# Patient Record
Sex: FEMALE | Race: BLACK OR AFRICAN AMERICAN | Employment: FULL TIME | URBAN - METROPOLITAN AREA
[De-identification: names, ages, dates, MRNs, and addresses within clinical notes are randomized per-mention and may not be internally consistent; named-entity substitution may affect disease eponyms.]

---

## 2017-04-07 ENCOUNTER — TELEPHONE (OUTPATIENT)
Dept: FAMILY PLANNING/WOMEN'S HEALTH CLINIC | Age: 57
End: 2017-04-07

## 2017-07-12 ENCOUNTER — HOSPITAL ENCOUNTER (OUTPATIENT)
Dept: MAMMOGRAPHY | Age: 57
Discharge: HOME | End: 2017-07-12
Attending: INTERNAL MEDICINE
Payer: COMMERCIAL

## 2017-07-12 DIAGNOSIS — Z12.31 VISIT FOR SCREENING MAMMOGRAM: ICD-10-CM

## 2017-07-12 PROCEDURE — 77067 SCR MAMMO BI INCL CAD: CPT

## 2017-07-31 ENCOUNTER — HOSPITAL ENCOUNTER (OUTPATIENT)
Dept: MAMMOGRAPHY | Age: 57
Discharge: HOME | End: 2017-07-31
Attending: INTERNAL MEDICINE
Payer: COMMERCIAL

## 2017-07-31 DIAGNOSIS — R92.8 ABNORMAL MAMMOGRAM OF RIGHT BREAST: ICD-10-CM

## 2017-07-31 PROCEDURE — 77065 DX MAMMO INCL CAD UNI: CPT

## 2017-08-18 ENCOUNTER — HOSPITAL ENCOUNTER (OUTPATIENT)
Dept: MAMMOGRAPHY | Age: 57
Discharge: HOME | End: 2017-08-18
Attending: INTERNAL MEDICINE
Payer: COMMERCIAL

## 2017-08-18 DIAGNOSIS — R92.1 BREAST CALCIFICATIONS: ICD-10-CM

## 2017-08-18 PROCEDURE — 74011000250 HC RX REV CODE- 250: Performed by: RADIOLOGY

## 2017-08-18 PROCEDURE — 88305 TISSUE EXAM BY PATHOLOGIST: CPT | Performed by: RADIOLOGY

## 2017-08-18 PROCEDURE — 77065 DX MAMMO INCL CAD UNI: CPT

## 2017-08-18 PROCEDURE — 77030030538 MAM STEREO VAC  BX BREAST RT 1ST LESION W/CLIP AND SPECIMEN

## 2017-08-18 RX ORDER — LIDOCAINE HYDROCHLORIDE AND EPINEPHRINE 10; 10 MG/ML; UG/ML
8 INJECTION, SOLUTION INFILTRATION; PERINEURAL ONCE
Status: COMPLETED | OUTPATIENT
Start: 2017-08-18 | End: 2017-08-18

## 2017-08-18 RX ORDER — LIDOCAINE HYDROCHLORIDE 10 MG/ML
12 INJECTION INFILTRATION; PERINEURAL
Status: COMPLETED | OUTPATIENT
Start: 2017-08-18 | End: 2017-08-18

## 2017-08-18 RX ADMIN — LIDOCAINE HYDROCHLORIDE,EPINEPHRINE BITARTRATE 80 MG: 10; .01 INJECTION, SOLUTION INFILTRATION; PERINEURAL at 09:40

## 2017-08-18 RX ADMIN — LIDOCAINE HYDROCHLORIDE 12 ML: 10 INJECTION, SOLUTION INFILTRATION; PERINEURAL at 09:40

## 2017-08-18 NOTE — PROGRESS NOTES
Patient tolerated procedure well with minimal bleeding and no bruising.  Discharge instructions were reviewed and results will be called to her Tuesday 8/22/17 between 1-3 pm.

## 2017-08-22 NOTE — PROGRESS NOTES
Results of biopsy were called to patient. She stated that she had reaction to dressing which turned red and irritated. She removed bandage and skin has improved. Otherwise biopsy site is healing well with no bruising.

## 2018-08-30 LAB
CREATININE, EXTERNAL: 0.72
LDL-C, EXTERNAL: 88

## 2018-08-31 LAB — HBA1C MFR BLD HPLC: 7.6 %

## 2018-10-12 ENCOUNTER — HOSPITAL ENCOUNTER (OUTPATIENT)
Dept: MAMMOGRAPHY | Age: 58
Discharge: HOME | End: 2018-10-12
Attending: INTERNAL MEDICINE
Payer: COMMERCIAL

## 2018-10-12 DIAGNOSIS — Z12.31 VISIT FOR SCREENING MAMMOGRAM: ICD-10-CM

## 2018-10-12 PROCEDURE — 77067 SCR MAMMO BI INCL CAD: CPT

## 2018-10-15 ENCOUNTER — OFFICE VISIT (OUTPATIENT)
Dept: ENDOCRINOLOGY | Age: 58
End: 2018-10-15

## 2018-10-15 VITALS
HEART RATE: 82 BPM | OXYGEN SATURATION: 98 % | TEMPERATURE: 97.8 F | RESPIRATION RATE: 14 BRPM | DIASTOLIC BLOOD PRESSURE: 71 MMHG | BODY MASS INDEX: 41.17 KG/M2 | WEIGHT: 247.1 LBS | SYSTOLIC BLOOD PRESSURE: 147 MMHG | HEIGHT: 65 IN

## 2018-10-15 DIAGNOSIS — I10 ESSENTIAL HYPERTENSION: ICD-10-CM

## 2018-10-15 DIAGNOSIS — E11.65 TYPE 2 DIABETES MELLITUS WITH HYPERGLYCEMIA, WITHOUT LONG-TERM CURRENT USE OF INSULIN (HCC): Primary | ICD-10-CM

## 2018-10-15 DIAGNOSIS — E11.65 TYPE 2 DIABETES MELLITUS WITH HYPERGLYCEMIA, UNSPECIFIED WHETHER LONG TERM INSULIN USE (HCC): Primary | ICD-10-CM

## 2018-10-15 DIAGNOSIS — E66.01 MORBID OBESITY (HCC): ICD-10-CM

## 2018-10-15 RX ORDER — GLIPIZIDE 10 MG/1
10 TABLET ORAL 2 TIMES DAILY
Qty: 180 TAB | Refills: 3 | Status: SHIPPED | OUTPATIENT
Start: 2018-10-15 | End: 2019-02-19 | Stop reason: SDUPTHER

## 2018-10-15 RX ORDER — ZINC GLUCONATE 10 MG
LOZENGE ORAL
COMMUNITY

## 2018-10-15 NOTE — MR AVS SNAPSHOT
[address] [facility] [address] 
[phone] Patient: Jalyn Ruelas MRN: [id number] VUT:[id number] Visit Information Date & Time Provider Department Dept. Phone Encounter #  
 10/15/2018  3:00 PM Boris Sherman MD [facility] Diabetes [facility] Endocrinology [phone] [id number] Follow-up Instructions Return in about 4 months (around 2/15/2019). Upcoming Health Maintenance Date Due Hepatitis C Screening 1960 FOOT EXAM Q1 9/21/1970 EYE EXAM RETINAL OR DILATED Q1 9/21/1970 Pneumococcal 19-64 Medium Risk (1 of 1 - PPSV23) 9/21/1979 DTaP/Tdap/Td series (1 - Tdap) 9/21/1981 Shingrix Vaccine Age 50> (1 of 2) 9/21/2010 FOBT Q 1 YEAR AGE 50-75 9/21/2010 LIPID PANEL Q1 12/13/2015 HEMOGLOBIN A1C Q6M 8/13/2016 MICROALBUMIN Q1 2/13/2017 PAP AKA CERVICAL CYTOLOGY 2/14/2018 Influenza Age 5 to Adult 8/1/2018 BREAST CANCER SCRN MAMMOGRAM 10/12/2020 Allergies as of 10/15/2018  Review Complete On: 10/15/2018 By: Boris Sherman MD  
  
 Severity Noted Reaction Type Reactions Nickel Medium 02/14/2015    Rash Linagliptin  12/14/2017    Swelling Swelling of tongue Current Immunizations  Never Reviewed Name Date Influenza Vaccine Ramona Koyanagi) 12/13/2014 Influenza Vaccine (Quad) PF 2/13/2016 TB Skin Test (PPD) Intradermal 12/13/2014 Not reviewed this visit You Were Diagnosed With   
  
 Codes Comments Type 2 diabetes mellitus with hyperglycemia, without long-term current use of insulin (HCC)    -  Primary ICD-10-CM: E11.65 ICD-9-CM: 250.00, 790.29 Essential hypertension     ICD-10-CM: I10 
ICD-9-CM: 401.9 Vitals BP Pulse Temp Resp Height(growth percentile) Weight(growth percentile) 147/71 (BP 1 Location: Left arm, BP Patient Position: Sitting) 82 97.8 °F (36.6 °C) (Oral) 14 5' 5\" (1.651 m) 247 lb 1.6 oz (112.1 kg) SpO2 BMI OB Status Smoking Status 98% 41.12 kg/m2 Menopause Never Smoker Vitals History BMI and BSA Data Body Mass Index Body Surface Area  
 41.12 kg/m 2 2.27 m 2 Preferred Pharmacy Pharmacy Name Phone [facility], [address] [facility] Your Updated Medication List  
  
   
This list is accurate as of 10/15/18  3:35 PM.  Always use your most recent med list.  
  
  
  
  
 albuterol 90 mcg/actuation inhaler Commonly known as:  PROVENTIL HFA, VENTOLIN HFA, PROAIR HFA Take 2 Puffs by inhalation every six (6) hours as needed for Wheezing. aspirin 81 mg chewable tablet Take 1 tablet by mouth daily. CALCIUM 600 + D(3) 600 mg(1,500mg) -200 unit Tab Generic drug:  calcium-vitamin D Take 1 tablet by mouth daily (with breakfast). CINNAMON PO Take 1,000 mg by mouth daily. ferrous sulfate 325 mg (65 mg iron) tablet Take 1 tablet by mouth Daily (before breakfast). furosemide 40 mg tablet Commonly known as:  LASIX Take 1 Tab by mouth four (4) days a week. For edema/bp control  
  
 glyBURIDE 5 mg tablet Commonly known as:  Curvin Filbert Take 0.5 Tabs by mouth two (2) times daily (with meals). hydrocortisone 1 % topical cream  
Commonly known as:  CORTAID Apply  to affected area two (2) times a day. use thin layer  
  
 loratadine 10 mg tablet Commonly known as:  Belkis Sandifer Take 1 Tab by mouth daily. losartan 100 mg tablet Commonly known as:  COZAAR Take 1 Tab by mouth daily. magnesium 250 mg Tab Take  by mouth.  
  
 metFORMIN  mg tablet Commonly known as:  GLUCOPHAGE XR Take 2 Tabs by mouth two (2) times daily (with meals). naproxen sodium 220 mg Cap Take 1 capsule by mouth daily as needed for Pain. traZODone 50 mg tablet Commonly known as:  Kyrie Angeles Take 0.5 Tabs by mouth nightly. vit B Cmplx 3-FA-Vit C-Biotin 1- mg-mg-mcg tablet Commonly known as:  NEPHRO SREA RX Take 1 tablet by mouth daily. vitamin E 400 unit capsule Commonly known as:  [address] Take 1 capsule by mouth daily. We Performed the Following MICROALBUMIN, UR, RAND W/ MICROALB/CREAT RATIO [id number] CPT(R)] Follow-up Instructions Return in about 4 months (around 2/15/2019). Introducing [facility] SERVICES! Dear Yelitza Tesfaye: Thank you for requesting a [employer] [employer] account. Our records indicate that you already have an active [employer] account. You can access your account anytime at [email or web address] Did you know that you can access your hospital and ER discharge instructions at any time in [employer]? You can also review all of your test results from your hospital stay or ER visit. Additional Information If you have questions, please visit the Frequently Asked Questions section of the [employer] website at [email or web address] Remember, [employer] is NOT to be used for urgent needs. For medical emergencies, dial 911. Now available from your iPhone and Android! Please provide this summary of care documentation to your next provider. Your primary care clinician is listed as Brooklyn Conklin. If you have any questions after today's visit, please call [phone].

## 2018-10-15 NOTE — PROGRESS NOTES
Renetta Mendez is a 62 y.o. female here for Chief Complaint Patient presents with  New Patient  
  referred by Dr. Stevenson Zambrano for DM Functional glucose monitor and record keeping system? - yes Eye exam within last year? - Dec 2017, VEI Foot exam within last year? - due 1. Have you been to the ER, urgent care clinic since your last visit? Hospitalized since your last visit? -n/a 2. Have you seen or consulted any other health care providers outside of the [address] [place] [address] since your last visit?   Include any pap smears or colon screening.-n/a

## 2018-10-15 NOTE — PROGRESS NOTES
[facility] DIABETES AND ENDOCRINOLOGY Richard Nguyen MD 
 
    [address] [phone] Fax [phone] Patient Information Date:10/15/2018 Name : Sugey Jurado 62 y.o.    
YOB: 1960 Referred by: Gabby Arauz MD  
 
 
 
Chief Complaint Patient presents with  New Patient History of Present Illness: Sugey Jurado is a 62 y.o. female here for initial visit of  Type 2 Diabetes Mellitus. Type 2 Diabetes was diagnosed 1998 . End organ effects of diabetes: retinopathy. Cardiovascular risk factors: family history, dyslipidemia Monitoring frequency:1 /day and readings run 70 - 130 Last A1C was high and symptoms include  polyuria, polydipsia Hypoglycemia: yes She is extended release, glyburide Weight trend: stable Prior visit with dietician: yes - many years ago Current diet: meals per day on average: 3 Current exercise: no regular exercise No chest pain, shortness of breath, dysphagia, occasional tingling, numbness Wt Readings from Last 3 Encounters:  
10/15/18 247 lb 1.6 oz (112.1 kg) 02/13/16 234 lb (106.1 kg) 07/20/15 225 lb (102.1 kg) BP Readings from Last 3 Encounters:  
10/15/18 147/71  
02/22/16 129/78  
02/13/16 131/70 Past Medical History:  
Diagnosis Date  Hyperlipidemia  Hypertension  Menopause  Type 2 diabetes mellitus ([facility] [address] Current Outpatient Prescriptions Medication Sig  
 magnesium 250 mg tab Take  by mouth.  cinnamon bark (CINNAMON PO) Take 1,000 mg by mouth daily.  metFORMIN ER (GLUCOPHAGE XR) 500 mg tablet Take 2 Tabs by mouth two (2) times daily (with meals).  losartan (COZAAR) 100 mg tablet Take 1 Tab by mouth daily.  vit B Cmplx 3-FA-Vit C-Biotin (NEPHRO SERA RX) 1- mg-mg-mcg tablet Take 1 tablet by mouth daily.  vitamin E (AQUA GEMS) 400 unit capsule Take 1 capsule by mouth daily.  calcium-vitamin D (CALCIUM 600 + D,3,) 600 mg(1,500mg) -200 unit tab Take 1 tablet by mouth daily (with breakfast).  glipiZIDE (GLUCOTROL) 10 mg tablet Take 1 Tab by mouth two (2) times a day. Stop Glyburide  albuterol (PROVENTIL HFA, VENTOLIN HFA, PROAIR HFA) 90 mcg/actuation inhaler Take 2 Puffs by inhalation every six (6) hours as needed for Wheezing.  furosemide (LASIX) 40 mg tablet Take 1 Tab by mouth four (4) days a week. For edema/bp control  loratadine (CLARITIN) 10 mg tablet Take 1 Tab by mouth daily.  traZODone (DESYREL) 50 mg tablet Take 0.5 Tabs by mouth nightly.  hydrocortisone (CORTAID) 1 % topical cream Apply  to affected area two (2) times a day. use thin layer  aspirin 81 mg chewable tablet Take 1 tablet by mouth daily.  ferrous sulfate 325 mg (65 mg iron) tablet Take 1 tablet by mouth Daily (before breakfast).  naproxen sodium 220 mg cap Take 1 capsule by mouth daily as needed for Pain. No current facility-administered medications for this visit. Allergies Allergen Reactions  Nickel Rash  Linagliptin Swelling Swelling of tongue Review of Systems:  All 10 systems reviewed and are negative other than mentioned in HPI Physical Examination: 
 Blood pressure 147/71, pulse 82, temperature 97.8 °F (36.6 °C), temperature source Oral, resp. rate 14, height 5' 5\" (1.651 m), weight 247 lb 1.6 oz (112.1 kg), SpO2 98 %. Estimated body mass index is 41.12 kg/(m^2) as calculated from the following: 
  Height as of this encounter: 5' 5\" (1.651 m). -   Weight as of this encounter: 247 lb 1.6 oz (112.1 kg). - General: pleasant, no distress, good eye contact 
- HEENT: no pallor, no periorbital edema, EOMI 
- Neck: supple, no thyromegaly, no nodules - Cardiovascular: regular,  normal S1 and S2, no murmurs - Respiratory: clear to auscultation bilaterally - Gastrointestinal: soft, nontender, nondistended,  BS + 
 - Musculoskeletal: no proximal muscle weakness in upper or lower extremities - Integumentary: no acanthosis nigricans, + edema,  
- Neurological: alert and oriented - Psychiatric: normal mood and affect 
- Skin: color, texture, turgor normal.  
 
Diabetic foot exam: October 2018 Left Foot: 
 Visual Exam: normal  
 Pulse DP: 2+ (normal) Filament test: normal sensation Vibratory sensation: diminished Right Foot: 
 Visual Exam: normal  
 Pulse DP: 2+ (normal) Filament test: normal sensation Vibratory sensation: diminished Data Reviewed:  
 
 
Assessment/Plan: 1. Type 2 diabetes mellitus with hyperglycemia, without long-term current use of insulin ([facility]) 2. Essential hypertension 1. Type 2 Diabetes Mellitus with  neuropathy,retinopathy Lab Results Component Value Date/Time Hemoglobin A1c 7.1 (H) 02/13/2016 01:04 PM  
 
Discussed pathophysiology of type 2 diabetes, decrease in the pancreatic reserve with time. A1c slightly above the goal 
Discontinue glyburide, risk of hypoglycemia is very high, discussed SGLT 2 inhibitors, GLP-1 agonists She had angioedema with Milton Manzano She is an RN by profession, dietitian visit offered She preferred glipizide for now, continue metformin Advised to check glucose 1-2 times daily Diabetic issues reviewed : glycemic goals , written exchange diet given, low carbohydrate diet, weight control , home glucose monitoring emphasized,  hypoglycemia management and long term diabetic complications discussed. FLU annually ,Pneumovax ,aspirin daily,annual eye exam,microalbumin 2. HTN : Continue current therapy 3. Hyperlipidemia : LDL 70-80 Not on statin, need to discuss statin benefits next visit 4. Obesity:Body mass index is 41.12 kg/(m^2). Discussed about the importance of exercise and carbohydrate portion control. 5.  Sleep apnea: Not on CPAP Right lower extremity edema could be due to right heart strain of untreated sleep apnea, venous insufficiency Compression stockings discussed There are no Patient Instructions on file for this visit. Follow-up Disposition: 
Return in about 4 months (around 2/15/2019). Thank you for allowing me to participate in the care of this patient. Preeti Darling MD 
 
 
Patient verbalized understanding Voice-recognition software was used to generate this report, which may result in some phonetic-based errors in the grammar and contents. Even though attempts were made to correct all the mistakes, some may have been missed and remained in the body of the report.

## 2018-10-16 LAB
ALBUMIN/CREAT UR: <3.6 MG/G CREAT (ref 0–30)
CREAT UR-MCNC: 83.2 MG/DL
MICROALBUMIN UR-MCNC: <3 UG/ML

## 2019-02-19 ENCOUNTER — OFFICE VISIT (OUTPATIENT)
Dept: ENDOCRINOLOGY | Age: 59
End: 2019-02-19

## 2019-02-19 VITALS
HEIGHT: 65 IN | HEART RATE: 83 BPM | SYSTOLIC BLOOD PRESSURE: 149 MMHG | TEMPERATURE: 98.2 F | OXYGEN SATURATION: 100 % | BODY MASS INDEX: 41.48 KG/M2 | WEIGHT: 249 LBS | DIASTOLIC BLOOD PRESSURE: 69 MMHG | RESPIRATION RATE: 14 BRPM

## 2019-02-19 DIAGNOSIS — I10 ESSENTIAL HYPERTENSION: ICD-10-CM

## 2019-02-19 DIAGNOSIS — E11.65 TYPE 2 DIABETES MELLITUS WITH HYPERGLYCEMIA, UNSPECIFIED WHETHER LONG TERM INSULIN USE (HCC): ICD-10-CM

## 2019-02-19 DIAGNOSIS — E11.9 TYPE 2 DIABETES MELLITUS WITHOUT COMPLICATION (HCC): ICD-10-CM

## 2019-02-19 DIAGNOSIS — E11.65 TYPE 2 DIABETES MELLITUS WITH HYPERGLYCEMIA, WITHOUT LONG-TERM CURRENT USE OF INSULIN (HCC): Primary | ICD-10-CM

## 2019-02-19 DIAGNOSIS — E66.01 OBESITY, MORBID (HCC): ICD-10-CM

## 2019-02-19 RX ORDER — LOSARTAN POTASSIUM 100 MG/1
100 TABLET ORAL DAILY
Qty: 90 TAB | Refills: 3 | Status: SHIPPED | OUTPATIENT
Start: 2019-02-19 | End: 2019-10-28 | Stop reason: ALTCHOICE

## 2019-02-19 RX ORDER — GLIPIZIDE 10 MG/1
10 TABLET ORAL 2 TIMES DAILY
Qty: 180 TAB | Refills: 3 | Status: SHIPPED | OUTPATIENT
Start: 2019-02-19 | End: 2020-03-04 | Stop reason: SDUPTHER

## 2019-02-19 RX ORDER — METFORMIN HYDROCHLORIDE 500 MG/1
1000 TABLET, EXTENDED RELEASE ORAL 2 TIMES DAILY WITH MEALS
Qty: 360 TAB | Refills: 3 | Status: SHIPPED | OUTPATIENT
Start: 2019-02-19 | End: 2020-03-04 | Stop reason: SDUPTHER

## 2019-02-19 NOTE — PROGRESS NOTES
Ash Solomon is a 62 y.o. female here for Chief Complaint Patient presents with  Diabetes Having pain in neck and choking sensation Pt was on prednisone after last visit Functional glucose monitor and record keeping system? - yes Eye exam within last year? - on file Foot exam within last year? - on file 1. Have you been to the ER, urgent care clinic since your last visit? Hospitalized since your last visit? -no 
 
2. Have you seen or consulted any other health care providers outside of the [address] [place] [address] since your last visit? Include any pap smears or colon screening. -PCP

## 2019-02-19 NOTE — PROGRESS NOTES
[facility] DIABETES AND ENDOCRINOLOGY Rudy Renteria MD 
 
    [address] [phone] Fax [phone] Patient Information Date:2/19/2019 Name : Nikky Bourne 62 y.o.    
YOB: 1960 Referred by: Titi Oneill MD  
 
 
 
Chief Complaint Patient presents with  Diabetes History of Present Illness: Nikky Bourne is a 62 y.o. female here for follow-up of  Type 2 Diabetes Mellitus. Type 2 Diabetes was diagnosed 1998 . End organ effects of diabetes: retinopathy. Cardiovascular risk factors: family history, dyslipidemia Off glyburide, given the expense of medication she is on glipizide now She does not think glipizide is helping her much Activity as also decreased Gradually gaining weight Current diet: meals per day on average: 3 Current exercise: no regular exercise No chest pain, shortness of breath, dysphagia, occasional tingling, numbness Wt Readings from Last 3 Encounters:  
02/19/19 249 lb (112.9 kg) 10/15/18 247 lb 1.6 oz (112.1 kg) 02/13/16 234 lb (106.1 kg) BP Readings from Last 3 Encounters:  
02/19/19 149/69  
10/15/18 147/71  
02/22/16 129/78 Past Medical History:  
Diagnosis Date  Hyperlipidemia  Hypertension  Menopause  Type 2 diabetes mellitus ([address] Current Outpatient Medications Medication Sig  
 magnesium 250 mg tab Take  by mouth.  glipiZIDE (GLUCOTROL) 10 mg tablet Take 1 Tab by mouth two (2) times a day. Stop Glyburide  metFORMIN ER (GLUCOPHAGE XR) 500 mg tablet Take 2 Tabs by mouth two (2) times daily (with meals).  losartan (COZAAR) 100 mg tablet Take 1 Tab by mouth daily.  vit B Cmplx 3-FA-Vit C-Biotin (NEPHRO SERA RX) 1- mg-mg-mcg tablet Take 1 tablet by mouth daily.  vitamin E (AQUA GEMS) 400 unit capsule Take 1 capsule by mouth daily.  calcium-vitamin D (CALCIUM 600 + D,3,) 600 mg(1,500mg) -200 unit tab Take 1 tablet by mouth daily (with breakfast).  cinnamon bark (CINNAMON PO) Take 1,000 mg by mouth daily. No current facility-administered medications for this visit. Allergies Allergen Reactions  Nickel Rash  Linagliptin Swelling Swelling of tongue Review of Systems:  All 10 systems reviewed and are negative other than mentioned in HPI Physical Examination: 
 Blood pressure 149/69, pulse 83, temperature 98.2 °F (36.8 °C), temperature source Oral, resp. rate 14, height 5' 5\" (1.651 m), weight 249 lb (112.9 kg), SpO2 100 %. Estimated body mass index is 41.44 kg/m² as calculated from the following: 
  Height as of this encounter: 5' 5\" (1.651 m). -   Weight as of this encounter: 249 lb (112.9 kg). - General: pleasant, no distress, good eye contact 
- HEENT: no pallor, no periorbital edema, EOMI 
- Neck: supple,  
- Cardiovascular: regular,  normal S1 and S2 
- Respiratory: clear to auscultation bilaterally - Gastrointestinal: soft, nontender, nondistended,  BS + 
- Musculoskeletal: no proximal muscle weakness in upper or lower extremities - Integumentary: no acanthosis nigricans, + edema,  
- Neurological: alert and oriented - Psychiatric: normal mood and affect 
- Skin: color, texture, turgor normal.  
 
Diabetic foot exam: October 2018 Left Foot: 
 Visual Exam: normal  
 Pulse DP: 2+ (normal) Filament test: normal sensation Vibratory sensation: diminished Right Foot: 
 Visual Exam: normal  
 Pulse DP: 2+ (normal) Filament test: normal sensation Vibratory sensation: diminished Data Reviewed:  
 
 
Assessment/Plan: 1. Type 2 diabetes mellitus with hyperglycemia, without long-term current use of insulin (HCC) 2. Essential hypertension 1. Type 2 Diabetes Mellitus with  neuropathy,retinopathy Lab Results Component Value Date/Time Hemoglobin A1c 8.1 (H) 02/12/2019 08:08 AM  
 Hemoglobin A1c, External 7.6 08/31/2018 A1c increased 
 discussed SGLT 2 inhibitors, GLP-1 agonists She had angioedema with Keanu Kraft She is an RN by profession, She preferred glipizide for now, continue metformin She will work on the lifestyle modifications, brand medications are expensive 2. HTN : Continue current therapy 3. Hyperlipidemia : LDL 70-80 Discussed and she understands the benefits of statins, declined statin use 4. Obesity:Body mass index is 41.44 kg/m². Discussed about the importance of exercise and carbohydrate portion control. 5.  Sleep apnea: There are no Patient Instructions on file for this visit. Follow-up Disposition: Not on File Thank you for allowing me to participate in the care of this patient. Adelina Jackson MD 
 
 
Patient verbalized understanding Voice-recognition software was used to generate this report, which may result in some phonetic-based errors in the grammar and contents. Even though attempts were made to correct all the mistakes, some may have been missed and remained in the body of the report.

## 2019-07-09 NOTE — PROGRESS NOTES
Barbra Huynh is a 62 y.o. female here for   Chief Complaint   Patient presents with    Diabetes      Pt went to ENT for choking sensation, they did a scope and didn't see anything. Had CT and found Thyroid nodule. Pt also had upper and lower GI - normal.    Functional glucose monitor and record keeping system? - yes  Eye exam within last year? - on file  Foot exam within last year? - on file    1. Have you been to the ER, urgent care clinic since your last visit? Hospitalized since your last visit? -no    2. Have you seen or consulted any other health care providers outside of the [address] [place] [address] since your last visit?   Include any pap smears or colon screening.- [place] [facility] Dr. Jadiel Woodward, Ortho and PCP

## 2019-07-10 ENCOUNTER — OFFICE VISIT (OUTPATIENT)
Dept: ENDOCRINOLOGY | Age: 59
End: 2019-07-10

## 2019-07-10 VITALS
HEIGHT: 65 IN | DIASTOLIC BLOOD PRESSURE: 53 MMHG | HEART RATE: 79 BPM | TEMPERATURE: 97.9 F | BODY MASS INDEX: 40.98 KG/M2 | OXYGEN SATURATION: 98 % | RESPIRATION RATE: 14 BRPM | SYSTOLIC BLOOD PRESSURE: 99 MMHG | WEIGHT: 246 LBS

## 2019-07-10 DIAGNOSIS — E04.9 NODULAR GOITER: ICD-10-CM

## 2019-07-10 DIAGNOSIS — I10 ESSENTIAL HYPERTENSION: ICD-10-CM

## 2019-07-10 DIAGNOSIS — E11.65 TYPE 2 DIABETES MELLITUS WITH HYPERGLYCEMIA, WITHOUT LONG-TERM CURRENT USE OF INSULIN (HCC): Primary | ICD-10-CM

## 2019-07-10 LAB
GLUCOSE POC: 123 MG/DL
HBA1C MFR BLD HPLC: 7.8 %

## 2019-07-10 NOTE — LETTER
7/13/19 Patient: Hector Raygoza YOB: 1960 Date of Visit: 7/10/2019 Fannie Majano MD 
[address] [facility] [address] [place] [address] VIA Facsimile: [phone] Dear Fannie Majano MD, Thank you for referring Ms. Shanika Morales to [facility] DIABETES & ENDOCRINOLOGY for evaluation. My notes for this consultation are attached. If you have questions, please do not hesitate to call me. I look forward to following your patient along with you. Sincerely, Maximiliano Currie MD

## 2019-07-10 NOTE — PROGRESS NOTES
[facility]INOLOG[facility]               Anastacio Srinivasan MD        [address] [phone] Fax [phone]               Patient Information  Date:7/13/2019  Name : Александр Hurley 62 y.o.     YOB: 1960         Referred by: Tran Restrepo MD         Chief Complaint   Patient presents with    Diabetes       History of Present Illness: Александр Hurley is a 62 y.o. female here for follow-up of  Type 2 Diabetes Mellitus. Type 2 Diabetes was diagnosed 1998 . End organ effects of diabetes: retinopathy. Cardiovascular risk factors: family history, dyslipidemia     Off glyburide, given the expense of medication she is on glipizide now  She does not think glipizide is helping her much  Weight has been stable no severe hypoglycemia      Current diet: meals per day on average: 3  Current exercise: no regular exercise    No chest pain, shortness of breath, dysphagia, occasional tingling, numbness    Wt Readings from Last 3 Encounters:   07/10/19 246 lb (111.6 kg)   02/19/19 249 lb (112.9 kg)   10/15/18 247 lb 1.6 oz (112.1 kg)       BP Readings from Last 3 Encounters:   07/10/19 99/53   02/19/19 149/69   10/15/18 147/71           Past Medical History:   Diagnosis Date    Hyperlipidemia     Hypertension     Menopause     Type 2 diabetes mellitus (HCC)      Current Outpatient Medications   Medication Sig    glipiZIDE (GLUCOTROL) 10 mg tablet Take 1 Tab by mouth two (2) times a day. Stop Glyburide    metFORMIN ER (GLUCOPHAGE XR) 500 mg tablet Take 2 Tabs by mouth two (2) times daily (with meals).  losartan (COZAAR) 100 mg tablet Take 1 Tab by mouth daily.  magnesium 250 mg tab Take  by mouth.  vit B Cmplx 3-FA-Vit C-Biotin (NEPHRO SERA RX) 1- mg-mg-mcg tablet Take 1 tablet by mouth daily.  vitamin E (AQUA GEMS) 400 unit capsule Take 1 capsule by mouth daily.     calcium-vitamin D (CALCIUM 600 + D,3,) 600 mg(1,500mg) -200 unit tab Take 1 tablet by mouth daily (with breakfast).  cinnamon bark (CINNAMON PO) Take 1,000 mg by mouth daily. No current facility-administered medications for this visit. Allergies   Allergen Reactions    Linagliptin Swelling     Swelling of tongue     Nickel Rash       Review of Systems:  All 10 systems reviewed and are negative other than mentioned in HPI    Physical Examination:   Blood pressure 99/53, pulse 79, temperature 97.9 °F (36.6 °C), temperature source Oral, resp. rate 14, height 5' 5\" (1.651 m), weight 246 lb (111.6 kg), SpO2 98 %. Estimated body mass index is 40.94 kg/m² as calculated from the following:    Height as of this encounter: 5' 5\" (1.651 m). -   Weight as of this encounter: 246 lb (111.6 kg). - General: pleasant, no distress, good eye contact  - HEENT: no pallor, no periorbital edema, EOMI  - Neck: supple,   - Cardiovascular: regular,  normal S1 and S2  - Respiratory: clear to auscultation bilaterally  - Gastrointestinal: soft, nontender, nondistended,  BS +  - Musculoskeletal: no proximal muscle weakness in upper or lower extremities  - Integumentary: no acanthosis nigricans, + edema,   - Neurological: alert and oriented  - Psychiatric: normal mood and affect  - Skin: color, texture, turgor normal.     Diabetic foot exam: October 2018    Left Foot:   Visual Exam: normal    Pulse DP: 2+ (normal)   Filament test: normal sensation    Vibratory sensation: diminished      Right Foot:   Visual Exam: normal    Pulse DP: 2+ (normal)   Filament test: normal sensation    Vibratory sensation: diminished      Data Reviewed:       Assessment/Plan:     1. Type 2 diabetes mellitus with hyperglycemia, without long-term current use of insulin ([address]    2. Essential hypertension    3. Nodular goiter        1.  Type 2 Diabetes Mellitus with  neuropathy,retinopathy  Lab Results   Component Value Date/Time    Hemoglobin A1c 8.1 (H) 02/12/2019 08:08 AM    Hemoglobin A1c (POC) 7.8 07/10/2019 09:07 AM Hemoglobin A1c, External 7.6 08/31/2018     Improved   discussed SGLT 2 inhibitors, GLP-1 agonists  She had angioedema with Tradjenta  She is an RN by profession,   She preferred glipizide for now, continue metformin  She will work on the lifestyle modifications, brand medications are expensive    2. HTN : Continue current therapy     3. Hyperlipidemia : LDL 70-80  Discussed and she understands the benefits of statins, declined statin use    4. Obesity:Body mass index is 40.94 kg/m². Discussed about the importance of exercise and carbohydrate portion control. 5.  Sleep apnea:     6. Nodular goiter: Seen ENT  1.2 cm nodule left 3/19     There are no Patient Instructions on file for this visit. Follow-up and Dispositions    · Return in about 3 months (around 10/10/2019). Thank you for allowing me to participate in the care of this patient. Briseida Tran MD      Patient verbalized understanding     Voice-recognition software was used to generate this report, which may result in some phonetic-based errors in the grammar and contents. Even though attempts were made to correct all the mistakes, some may have been missed and remained in the body of the report.

## 2019-07-11 LAB
BUN SERPL-MCNC: 11 MG/DL (ref 6–24)
BUN/CREAT SERPL: 16 (ref 9–23)
CALCIUM SERPL-MCNC: 9.1 MG/DL (ref 8.7–10.2)
CHLORIDE SERPL-SCNC: 105 MMOL/L (ref 96–106)
CO2 SERPL-SCNC: 23 MMOL/L (ref 20–29)
CREAT SERPL-MCNC: 0.7 MG/DL (ref 0.57–1)
GLUCOSE SERPL-MCNC: 118 MG/DL (ref 65–99)
POTASSIUM SERPL-SCNC: 4.3 MMOL/L (ref 3.5–5.2)
SODIUM SERPL-SCNC: 142 MMOL/L (ref 134–144)
TSH SERPL DL<=0.005 MIU/L-ACNC: 0.99 UIU/ML (ref 0.45–4.5)

## 2019-07-25 ENCOUNTER — HOSPITAL ENCOUNTER (OUTPATIENT)
Dept: ULTRASOUND IMAGING | Age: 59
Discharge: HOME | End: 2019-07-25
Attending: INTERNAL MEDICINE
Payer: COMMERCIAL

## 2019-07-25 DIAGNOSIS — E04.9 NODULAR GOITER: ICD-10-CM

## 2019-07-25 PROCEDURE — 76536 US EXAM OF HEAD AND NECK: CPT

## 2019-10-28 ENCOUNTER — OFFICE VISIT (OUTPATIENT)
Dept: ENDOCRINOLOGY | Age: 59
End: 2019-10-28

## 2019-10-28 VITALS
TEMPERATURE: 97.6 F | BODY MASS INDEX: 41.45 KG/M2 | DIASTOLIC BLOOD PRESSURE: 88 MMHG | WEIGHT: 248.8 LBS | OXYGEN SATURATION: 96 % | SYSTOLIC BLOOD PRESSURE: 179 MMHG | HEIGHT: 65 IN | RESPIRATION RATE: 15 BRPM | HEART RATE: 78 BPM

## 2019-10-28 DIAGNOSIS — E11.65 TYPE 2 DIABETES MELLITUS WITH HYPERGLYCEMIA, WITHOUT LONG-TERM CURRENT USE OF INSULIN (HCC): Primary | ICD-10-CM

## 2019-10-28 DIAGNOSIS — R13.12 OROPHARYNGEAL DYSPHAGIA: ICD-10-CM

## 2019-10-28 DIAGNOSIS — I10 ESSENTIAL HYPERTENSION: ICD-10-CM

## 2019-10-28 DIAGNOSIS — K21.9 GASTROESOPHAGEAL REFLUX DISEASE WITHOUT ESOPHAGITIS: ICD-10-CM

## 2019-10-28 LAB — HBA1C MFR BLD HPLC: 8 %

## 2019-10-28 RX ORDER — VALSARTAN AND HYDROCHLOROTHIAZIDE 160; 12.5 MG/1; MG/1
1 TABLET, FILM COATED ORAL DAILY
Qty: 30 TAB | Refills: 4 | Status: SHIPPED | OUTPATIENT
Start: 2019-10-28 | End: 2020-01-29

## 2019-10-28 NOTE — PROGRESS NOTES
1. Have you been to the ER, urgent care clinic since your last visit? Hospitalized since your last visit? No    2. Have you seen or consulted any other health care providers outside of the [address] [place] [address] since your last visit? Include any pap smears or colon screening. No     Chief Complaint   Patient presents with    Diabetes     3 month follow up    Thyroid Problem     states wants to discuss issues from last visit on thyroid; wants to know if thyroid issues can cause hearing problems.       Not fasting

## 2019-10-28 NOTE — PROGRESS NOTES
[facility]               Macrina Mustafa MD        [address] 78 [phone] Fax [phone]               Patient Information  Date:10/28/2019  Name : John Sevilla 61 y.o.     YOB: 1960         Referred by: Ethel Cain MD         Chief Complaint   Patient presents with    Diabetes     3 month follow up    Thyroid Problem     states wants to discuss issues from last visit on thyroid; wants to know if thyroid issues can cause hearing problems. History of Present Illness: John Sevilla is a 61 y.o. female here for follow-up of  Type 2 Diabetes Mellitus. Type 2 Diabetes was diagnosed 1998 . End organ effects of diabetes: retinopathy. Cardiovascular risk factors: family history, dyslipidemia   Given the expense of brand medications she is on glipizide, metformin  Complains of dysphagia, thyroid nodule 1.2 cm , was diagnosed with gastritis in April 2019, no esophagitis. Weight has been stable    She is checking blood glucose, fasting less than 120  Diet could be healthy    No chest pain, shortness of breath, dysphagia, occasional tingling, numbness    Wt Readings from Last 3 Encounters:   10/28/19 248 lb 12.8 oz (112.9 kg)   07/10/19 246 lb (111.6 kg)   02/19/19 249 lb (112.9 kg)       BP Readings from Last 3 Encounters:   10/28/19 179/88   07/10/19 99/53   02/19/19 149/69           Past Medical History:   Diagnosis Date    Hyperlipidemia     Hypertension     Menopause     Type 2 diabetes mellitus (HCC)      Current Outpatient Medications   Medication Sig    glipiZIDE (GLUCOTROL) 10 mg tablet Take 1 Tab by mouth two (2) times a day. Stop Glyburide    metFORMIN ER (GLUCOPHAGE XR) 500 mg tablet Take 2 Tabs by mouth two (2) times daily (with meals).  magnesium 250 mg tab Take  by mouth.  vit B Cmplx 3-FA-Vit C-Biotin (NEPHRO SERA RX) 1- mg-mg-mcg tablet Take 1 tablet by mouth daily.     vitamin E (AQUA GEMS) 400 unit capsule Take 1 capsule by mouth daily.  calcium-vitamin D (CALCIUM 600 + D,3,) 600 mg(1,500mg) -200 unit tab Take 1 tablet by mouth daily (with breakfast).  losartan (COZAAR) 100 mg tablet Take 1 Tab by mouth daily. (Patient not taking: Reported on 10/28/2019)    cinnamon bark (CINNAMON PO) Take 1,000 mg by mouth daily. No current facility-administered medications for this visit. Allergies   Allergen Reactions    Linagliptin Swelling     Swelling of tongue     Nickel Rash       Review of Systems:  All 10 systems reviewed and are negative other than mentioned in HPI    Physical Examination:   Blood pressure 179/88, pulse 78, temperature 97.6 °F (36.4 °C), temperature source Oral, resp. rate 15, height 5' 5\" (1.651 m), weight 248 lb 12.8 oz (112.9 kg), SpO2 96 %. Estimated body mass index is 41.4 kg/m² as calculated from the following:    Height as of this encounter: 5' 5\" (1.651 m). -   Weight as of this encounter: 248 lb 12.8 oz (112.9 kg). - General: pleasant, no distress, good eye contact  - HEENT: no pallor, no periorbital edema, EOMI  - Neck: supple,   - Cardiovascular: regular,  normal S1 and S2  - Respiratory: clear to auscultation bilaterally  - Gastrointestinal: soft, nontender, nondistended,  BS +  - Musculoskeletal: no proximal muscle weakness in upper or lower extremities  - Integumentary: no acanthosis nigricans, + edema,   - Neurological: alert and oriented  - Psychiatric: normal mood and affect  - Skin: color, texture, turgor normal.     Diabetic foot exam: October 2018    Left Foot:   Visual Exam: normal    Pulse DP: 2+ (normal)   Filament test: normal sensation    Vibratory sensation: diminished      Right Foot:   Visual Exam: normal    Pulse DP: 2+ (normal)   Filament test: normal sensation    Vibratory sensation: diminished      Data Reviewed:       Assessment/Plan:     1.  Type 2 diabetes mellitus with hyperglycemia, without long-term current use of insulin ([facility]) 1. Type 2 Diabetes Mellitus with  neuropathy,retinopathy  Lab Results   Component Value Date/Time    Hemoglobin A1c 8.1 (H) 02/12/2019 08:08 AM    Hemoglobin A1c (POC) 8.0 10/28/2019 04:07 PM    Hemoglobin A1c, External 7.6 08/31/2018     Uncontrolled   discussed SGLT 2 inhibitors, GLP-1 agonists-did not want any medications. Agreed to lose weight  She had angioedema with Tradjenta  She is an RN by profession,   Glipizide, metformin  She will work on the lifestyle modifications, brand medications are expensive    2. HTN : Did not get the medication from the pharmacy, change it to Diovan HCT    3. Hyperlipidemia : LDL 70-80  Discussed and she understands the benefits of statins, declined statin use    4. Obesity:Body mass index is 41.4 kg/m². Discussed about the importance of exercise and carbohydrate portion control. 5.  Sleep apnea:     6. Nodular goiter: Seen ENT  1.2 cm nodule left 3/19: This is not the cause for the dysphagia, GERD contributing, PPI for 4 weeks, if no improvement to follow-up with ENT. Also complains of hearing loss,    GERD: PPI,    There are no Patient Instructions on file for this visit. Thank you for allowing me to participate in the care of this patient. Haig Kocher, MD      Patient verbalized understanding     Voice-recognition software was used to generate this report, which may result in some phonetic-based errors in the grammar and contents. Even though attempts were made to correct all the mistakes, some may have been missed and remained in the body of the report.

## 2019-10-28 NOTE — LETTER
10/28/19 Patient: Evan Galindo YOB: 1960 Date of Visit: 10/28/2019 Mehran Sevilla MD 
[address] [place] [id number] VIA Facsimile: [phone] Dear Mehran Sevilla MD, Thank you for referring Ms. Jack Carrasco to [facility] for evaluation. My notes for this consultation are attached. If you have questions, please do not hesitate to call me. I look forward to following your patient along with you. Sincerely, Alcira Diego MD

## 2020-01-29 RX ORDER — VALSARTAN AND HYDROCHLOROTHIAZIDE 160; 12.5 MG/1; MG/1
TABLET, FILM COATED ORAL
Qty: 90 TAB | Refills: 1 | Status: SHIPPED | OUTPATIENT
Start: 2020-01-29 | End: 2020-03-04 | Stop reason: SDUPTHER

## 2020-02-28 ENCOUNTER — HOSPITAL ENCOUNTER (OUTPATIENT)
Dept: LAB | Age: 60
Discharge: HOME | End: 2020-02-28

## 2020-02-28 DIAGNOSIS — R13.12 OROPHARYNGEAL DYSPHAGIA: ICD-10-CM

## 2020-02-28 DIAGNOSIS — K21.9 GASTROESOPHAGEAL REFLUX DISEASE WITHOUT ESOPHAGITIS: ICD-10-CM

## 2020-02-28 DIAGNOSIS — I10 ESSENTIAL HYPERTENSION: ICD-10-CM

## 2020-02-28 DIAGNOSIS — E11.65 TYPE 2 DIABETES MELLITUS WITH HYPERGLYCEMIA, WITHOUT LONG-TERM CURRENT USE OF INSULIN (HCC): ICD-10-CM

## 2020-02-28 LAB
ANION GAP SERPL CALC-SCNC: 3 MMOL/L (ref 5–15)
BUN SERPL-MCNC: 13 MG/DL (ref 6–20)
BUN/CREAT SERPL: 19 (ref 12–20)
CALCIUM SERPL-MCNC: 8.5 MG/DL (ref 8.5–10.1)
CHLORIDE SERPL-SCNC: 107 MMOL/L (ref 97–108)
CO2 SERPL-SCNC: 30 MMOL/L (ref 21–32)
CREAT SERPL-MCNC: 0.7 MG/DL (ref 0.55–1.02)
GLUCOSE SERPL-MCNC: 179 MG/DL (ref 65–100)
POTASSIUM SERPL-SCNC: 4.3 MMOL/L (ref 3.5–5.1)
SODIUM SERPL-SCNC: 140 MMOL/L (ref 136–145)

## 2020-02-29 LAB
CREAT UR-MCNC: 217 MG/DL
EST. AVERAGE GLUCOSE BLD GHB EST-MCNC: 200 MG/DL
HBA1C MFR BLD: 8.6 % (ref 4–5.6)
MICROALBUMIN UR-MCNC: 1.3 MG/DL
MICROALBUMIN/CREAT UR-RTO: 6 MG/G (ref 0–30)

## 2020-03-04 ENCOUNTER — OFFICE VISIT (OUTPATIENT)
Dept: ENDOCRINOLOGY | Age: 60
End: 2020-03-04

## 2020-03-04 VITALS
SYSTOLIC BLOOD PRESSURE: 142 MMHG | HEIGHT: 65 IN | TEMPERATURE: 98.2 F | WEIGHT: 247.5 LBS | BODY MASS INDEX: 41.23 KG/M2 | HEART RATE: 82 BPM | OXYGEN SATURATION: 98 % | DIASTOLIC BLOOD PRESSURE: 75 MMHG | RESPIRATION RATE: 16 BRPM

## 2020-03-04 DIAGNOSIS — E11.65 TYPE 2 DIABETES MELLITUS WITH HYPERGLYCEMIA, WITHOUT LONG-TERM CURRENT USE OF INSULIN (HCC): Primary | ICD-10-CM

## 2020-03-04 DIAGNOSIS — I10 ESSENTIAL HYPERTENSION: ICD-10-CM

## 2020-03-04 DIAGNOSIS — E66.01 MORBID OBESITY (HCC): ICD-10-CM

## 2020-03-04 RX ORDER — ALBUTEROL SULFATE 0.83 MG/ML
2.5 SOLUTION RESPIRATORY (INHALATION)
COMMUNITY

## 2020-03-04 RX ORDER — ALBUTEROL SULFATE 90 UG/1
2 AEROSOL, METERED RESPIRATORY (INHALATION)
COMMUNITY

## 2020-03-04 RX ORDER — VALSARTAN AND HYDROCHLOROTHIAZIDE 160; 12.5 MG/1; MG/1
TABLET, FILM COATED ORAL
Qty: 90 TAB | Refills: 3 | Status: SHIPPED | OUTPATIENT
Start: 2020-03-04 | End: 2021-05-16

## 2020-03-04 RX ORDER — GLIPIZIDE 10 MG/1
10 TABLET ORAL 2 TIMES DAILY
Qty: 180 TAB | Refills: 3 | Status: SHIPPED | OUTPATIENT
Start: 2020-03-04 | End: 2021-04-17

## 2020-03-04 RX ORDER — METFORMIN HYDROCHLORIDE 500 MG/1
1000 TABLET, EXTENDED RELEASE ORAL 2 TIMES DAILY WITH MEALS
Qty: 360 TAB | Refills: 3 | Status: SHIPPED | OUTPATIENT
Start: 2020-03-04 | End: 2021-03-08

## 2020-03-04 NOTE — PROGRESS NOTES
Delia Parsons MD                  Patient Information  Date:3/4/2020  Name : Ave Chang 61 y.o.     YOB: 1960         Referred by: Patricia Cohen MD         Chief Complaint   Patient presents with    Diabetes       History of Present Illness: Ave Chang is a 61 y.o. female here for follow-up of  Type 2 Diabetes Mellitus. Type 2 Diabetes was diagnosed 1998 . End organ effects of diabetes: retinopathy. Cardiovascular risk factors: family history, dyslipidemia   Given the expense of brand medications she is on glipizide, metformin  No logbook  Weight has been stable        No chest pain, shortness of breath, dysphagia, occasional tingling, numbness    Wt Readings from Last 3 Encounters:   03/04/20 247 lb 8 oz (112.3 kg)   10/28/19 248 lb 12.8 oz (112.9 kg)   07/10/19 246 lb (111.6 kg)       BP Readings from Last 3 Encounters:   03/04/20 142/75   10/28/19 179/88   07/10/19 99/53           Past Medical History:   Diagnosis Date    Hyperlipidemia     Hypertension     Menopause     Type 2 diabetes mellitus (HCC)      Current Outpatient Medications   Medication Sig    albuterol (PROVENTIL VENTOLIN) 2.5 mg /3 mL (0.083 %) nebu by Nebulization route.  albuterol (PROVENTIL HFA, VENTOLIN HFA, PROAIR HFA) 90 mcg/actuation inhaler Take  by inhalation.  valsartan-hydroCHLOROthiazide (DIOVAN-HCT) 160-12.5 mg per tablet TAKE 1 TABLET BY MOUTH EVERY DAY    glipiZIDE (GLUCOTROL) 10 mg tablet Take 1 Tab by mouth two (2) times a day. Stop Glyburide    metFORMIN ER (GLUCOPHAGE XR) 500 mg tablet Take 2 Tabs by mouth two (2) times daily (with meals).  magnesium 250 mg tab Take  by mouth.  vit B Cmplx 3-FA-Vit C-Biotin (NEPHRO SERA RX) 1- mg-mg-mcg tablet Take 1 tablet by mouth daily.  vitamin E (AQUA GEMS) 400 unit capsule Take 1 capsule by mouth daily.     calcium-vitamin D (CALCIUM 600 + D,3,) 600 mg(1,500mg) -200 unit tab Take 1 tablet by mouth daily (with breakfast).  cinnamon bark (CINNAMON PO) Take 1,000 mg by mouth daily. No current facility-administered medications for this visit. Allergies   Allergen Reactions    Linagliptin Swelling     Swelling of tongue     Nickel Rash       Review of Systems:  All 10 systems reviewed and are negative other than mentioned in HPI    Physical Examination:   Blood pressure 142/75, pulse 82, temperature 98.2 °F (36.8 °C), temperature source Oral, resp. rate 16, height 5' 5\" (1.651 m), weight 247 lb 8 oz (112.3 kg), SpO2 98 %. Estimated body mass index is 41.19 kg/m² as calculated from the following:    Height as of this encounter: 5' 5\" (1.651 m). -   Weight as of this encounter: 247 lb 8 oz (112.3 kg). - General: pleasant, no distress, good eye contact  - HEENT: no pallor, no periorbital edema, EOMI  - Neck: supple,   - Cardiovascular: regular,  normal S1 and S2  - Respiratory: clear to auscultation bilaterally  - Gastrointestinal: soft, nontender, nondistended,  BS +  - Musculoskeletal: no proximal muscle weakness in upper or lower extremities  - Integumentary: no acanthosis nigricans, + edema,   - Neurological: alert and oriented  - Psychiatric: normal mood and affect  - Skin: color, texture, turgor normal.     Diabetic foot exam: October 2018    Left Foot:   Visual Exam: normal    Pulse DP: 2+ (normal)   Filament test: normal sensation    Vibratory sensation: diminished      Right Foot:   Visual Exam: normal    Pulse DP: 2+ (normal)   Filament test: normal sensation    Vibratory sensation: diminished      Data Reviewed:       Assessment/Plan:     1. Type 2 diabetes mellitus with hyperglycemia, without long-term current use of insulin ([address]    2. Essential hypertension        1.  Type 2 Diabetes Mellitus with  neuropathy,retinopathy  Lab Results   Component Value Date/Time    Hemoglobin A1c 8.6 (H) 02/28/2020 08:27 AM    Hemoglobin A1c (POC) 8.0 10/28/2019 04:07 PM Hemoglobin A1c, External 7.6 08/31/2018     Uncontrolled   discussed SGLT 2 inhibitors, GLP-1 agonists-did not want any additional medications. Again encouraged to lose weight, make changes to the diet    She had angioedema with Tradjentdomonique  She is an RN by profession,   Glipizide, metformin    Medications are expensive    2. HTN : Did not get the medication from the pharmacy, change it to Diovan HCT    3. Hyperlipidemia : LDL 70-80  Discussed and she understands the benefits of statins, declined statin use    4. Obesity:Body mass index is 41.19 kg/m². Discussed about the importance of exercise and carbohydrate portion control. 5.  Sleep apnea:     6. Nodular goiter: Seen ENT  1.2 cm nodule left 3/19: This is not the cause for the dysphagia, GERD contributing, PPI for 4 weeks, if no improvement to follow-up with ENT. Also complains of hearing loss,    GERD: PPI,    There are no Patient Instructions on file for this visit. Thank you for allowing me to participate in the care of this patient. Shari Barboza MD      Patient verbalized understanding     Voice-recognition software was used to generate this report, which may result in some phonetic-based errors in the grammar and contents. Even though attempts were made to correct all the mistakes, some may have been missed and remained in the body of the report.

## 2020-03-04 NOTE — PROGRESS NOTES
Ave Chang is a 61 y.o. female here for   Chief Complaint   Patient presents with    Diabetes       1. Have you been to the ER, urgent care clinic since your last visit? Hospitalized since your last visit? -no    2. Have you seen or consulted any other health care providers outside of the [address] [place] [address] since your last visit? Include any pap smears or colon screening. -PCP

## 2020-03-04 NOTE — LETTER
3/4/20 Patient: Ricardo Farah YOB: 1960 Date of Visit: 3/4/2020 Karl Osman MD 
[address] [place] [address] VIA Facsimile: [phone] Dear Karl Osman MD, Thank you for referring Ms. Maria Luz King to [facility] for evaluation. My notes for this consultation are attached. If you have questions, please do not hesitate to call me. I look forward to following your patient along with you. Sincerely, Joe Miller MD

## 2020-07-27 ENCOUNTER — OFFICE VISIT (OUTPATIENT)
Dept: ENDOCRINOLOGY | Age: 60
End: 2020-07-27

## 2020-07-27 VITALS
WEIGHT: 241 LBS | SYSTOLIC BLOOD PRESSURE: 152 MMHG | BODY MASS INDEX: 40.15 KG/M2 | DIASTOLIC BLOOD PRESSURE: 75 MMHG | HEART RATE: 83 BPM | RESPIRATION RATE: 16 BRPM | TEMPERATURE: 97.4 F | HEIGHT: 65 IN

## 2020-07-27 DIAGNOSIS — E78.2 MIXED HYPERLIPIDEMIA: ICD-10-CM

## 2020-07-27 DIAGNOSIS — I10 ESSENTIAL HYPERTENSION: ICD-10-CM

## 2020-07-27 DIAGNOSIS — E66.01 MORBID OBESITY (HCC): ICD-10-CM

## 2020-07-27 DIAGNOSIS — E11.65 TYPE 2 DIABETES MELLITUS WITH HYPERGLYCEMIA, WITHOUT LONG-TERM CURRENT USE OF INSULIN (HCC): Primary | ICD-10-CM

## 2020-07-27 LAB — HBA1C MFR BLD HPLC: 8.3 %

## 2020-07-27 NOTE — LETTER
7/27/20 Patient: Angela Nur YOB: 1960 Date of Visit: 7/27/2020 Candace Carlisle MD 
[address] [place] [address] VIA Facsimile: [phone] Dear Candace Carlisle MD, Thank you for referring Ms. Nino Gallegos to [facility] for evaluation. My notes for this consultation are attached. If you have questions, please do not hesitate to call me. I look forward to following your patient along with you. Sincerely, Daniel Arriaza MD

## 2020-07-27 NOTE — PROGRESS NOTES
Ariana Barlow MD                  Patient Information  Date:7/27/2020  Name : Skye Mcelroy 61 y.o.     YOB: 1960         Referred by: Enrique Scherer MD         Chief Complaint   Patient presents with    Diabetes       History of Present Illness: Skye Mcelroy is a 61 y.o. female here for follow-up of  Type 2 Diabetes Mellitus. Type 2 Diabetes was diagnosed 1998 . End organ effects of diabetes: retinopathy. Cardiovascular risk factors: family history, dyslipidemia   Given the expense of brand medications she is on glipizide, metformin  She did not bring the logbook, per recall blood glucose are ranging from 150-180  Has hypoglycemia when she misses meal  No improvement in the blood glucose  Complains of cramping on activity  Weight has been stable        No chest pain, shortness of breath, dysphagia, occasional tingling, numbness    Wt Readings from Last 3 Encounters:   03/04/20 247 lb 8 oz (112.3 kg)   10/28/19 248 lb 12.8 oz (112.9 kg)   07/10/19 246 lb (111.6 kg)       BP Readings from Last 3 Encounters:   03/04/20 142/75   10/28/19 179/88   07/10/19 99/53           Past Medical History:   Diagnosis Date    Hyperlipidemia     Hypertension     Menopause     Type 2 diabetes mellitus (HCC)      Current Outpatient Medications   Medication Sig    albuterol (PROVENTIL VENTOLIN) 2.5 mg /3 mL (0.083 %) nebu by Nebulization route.  albuterol (PROVENTIL HFA, VENTOLIN HFA, PROAIR HFA) 90 mcg/actuation inhaler Take  by inhalation.  glipiZIDE (GLUCOTROL) 10 mg tablet Take 1 Tab by mouth two (2) times a day. Stop Glyburide    metFORMIN ER (GLUCOPHAGE XR) 500 mg tablet Take 2 Tabs by mouth two (2) times daily (with meals).  valsartan-hydroCHLOROthiazide (DIOVAN-HCT) 160-12.5 mg per tablet TAKE 1 TABLET BY MOUTH EVERY DAY    magnesium 250 mg tab Take  by mouth.     vitamin E (AQUA GEMS) 400 unit capsule Take 1 capsule by mouth daily.    calcium-vitamin D (CALCIUM 600 + D,3,) 600 mg(1,500mg) -200 unit tab Take 1 tablet by mouth daily (with breakfast).  cinnamon bark (CINNAMON PO) Take 1,000 mg by mouth daily.  vit B Cmplx 3-FA-Vit C-Biotin (NEPHRO SERA RX) 1- mg-mg-mcg tablet Take 1 tablet by mouth daily. No current facility-administered medications for this visit. Allergies   Allergen Reactions    Linagliptin Swelling     Swelling of tongue     Nickel Rash       Review of Systems:  All 10 systems reviewed and are negative other than mentioned in HPI    Physical Examination:   There were no vitals taken for this visit. Estimated body mass index is 41.19 kg/m² as calculated from the following:    Height as of 3/4/20: 5' 5\" (1.651 m). -   Weight as of 3/4/20: 247 lb 8 oz (112.3 kg). - General: pleasant, no distress, good eye contact  - HEENT: no pallor, no periorbital edema, EOMI  - Neck: supple,   - Cardiovascular: regular,  normal S1 and S2  - Respiratory: clear to auscultation bilaterally  -   - Musculoskeletal: no proximal muscle weakness in upper or lower extremities  - Integumentary: no acanthosis nigricans, + edema,   - Neurological: alert and oriented  - Psychiatric: normal mood and affect  - Skin: color, texture, turgor normal.     Diabetic foot exam: October 2018    Left Foot:   Visual Exam: normal    Pulse DP: 2+ (normal)   Filament test: normal sensation    Vibratory sensation: diminished      Right Foot:   Visual Exam: normal    Pulse DP: 2+ (normal)   Filament test: normal sensation    Vibratory sensation: diminished      Data Reviewed:       Assessment/Plan:     1. Type 2 diabetes mellitus with hyperglycemia, without long-term current use of insulin ([address]    2. Essential hypertension        1.  Type 2 Diabetes Mellitus with  neuropathy,retinopathy  Lab Results   Component Value Date/Time    Hemoglobin A1c 8.6 (H) 02/28/2020 08:27 AM    Hemoglobin A1c (POC) 8.0 10/28/2019 04:07 PM    Hemoglobin A1c, External 7.6 08/31/2018     Uncontrolled   discussed SGLT 2 inhibitors, GLP-1 agonists again  Compliance with the diet, need glucose data  Again encouraged to lose weight, make changes to the diet    She had angioedema with Tradjenta  She is an RN by profession,   Glipizide, metformin  FArxiga 10 mg, side effects discussed  She did not want GLP-1 agonist        2. HTN : Did not take the blood pressure medication, blood pressure is high    3. Hyperlipidemia :      Discussed and she understands the benefits of statins, declined statin use    4. Obesity:There is no height or weight on file to calculate BMI. Discussed about the importance of exercise and carbohydrate portion control. 5.  Sleep apnea:     6. Nodular goiter: Seen ENT  1.2 cm nodule left 3/19: This is not the cause for the dysphagia, GERD contributing, PPI for 4 weeks, if no improvement to follow-up with ENT. Also complains of hearing loss,    GERD: PPI,    Cramping: Hydration, on calcium supplements, banana    There are no Patient Instructions on file for this visit. Thank you for allowing me to participate in the care of this patient. Tyrel Mckeon MD      Patient verbalized understanding     Voice-recognition software was used to generate this report, which may result in some phonetic-based errors in the grammar and contents. Even though attempts were made to correct all the mistakes, some may have been missed and remained in the body of the report.

## 2020-07-27 NOTE — PROGRESS NOTES
Wt Readings from Last 3 Encounters:   07/27/20 241 lb (109.3 kg)   03/04/20 247 lb 8 oz (112.3 kg)   10/28/19 248 lb 12.8 oz (112.9 kg)     Temp Readings from Last 3 Encounters:   07/27/20 97.4 °F (36.3 °C) (Oral)   03/04/20 98.2 °F (36.8 °C) (Oral)   10/28/19 97.6 °F (36.4 °C) (Oral)     BP Readings from Last 3 Encounters:   07/27/20 152/75   03/04/20 142/75   10/28/19 179/88     Pulse Readings from Last 3 Encounters:   07/27/20 83   03/04/20 82   10/28/19 78     Lab Results   Component Value Date/Time    Hemoglobin A1c 8.6 (H) 02/28/2020 08:27 AM    Hemoglobin A1c (POC) 8.0 10/28/2019 04:07 PM    Hemoglobin A1c, External 7.6 08/31/2018

## 2020-10-01 DIAGNOSIS — E11.65 TYPE 2 DIABETES MELLITUS WITH HYPERGLYCEMIA, WITHOUT LONG-TERM CURRENT USE OF INSULIN (HCC): ICD-10-CM

## 2020-10-01 DIAGNOSIS — I10 ESSENTIAL HYPERTENSION: ICD-10-CM

## 2020-10-27 ENCOUNTER — OFFICE VISIT (OUTPATIENT)
Dept: ENDOCRINOLOGY | Age: 60
End: 2020-10-27
Payer: COMMERCIAL

## 2020-10-27 VITALS
TEMPERATURE: 97.7 F | SYSTOLIC BLOOD PRESSURE: 138 MMHG | DIASTOLIC BLOOD PRESSURE: 63 MMHG | HEART RATE: 72 BPM | HEIGHT: 65 IN | BODY MASS INDEX: 40.15 KG/M2 | RESPIRATION RATE: 16 BRPM | WEIGHT: 241 LBS | OXYGEN SATURATION: 97 %

## 2020-10-27 DIAGNOSIS — E11.65 TYPE 2 DIABETES MELLITUS WITH HYPERGLYCEMIA, WITHOUT LONG-TERM CURRENT USE OF INSULIN (HCC): Primary | ICD-10-CM

## 2020-10-27 DIAGNOSIS — I10 ESSENTIAL HYPERTENSION: ICD-10-CM

## 2020-10-27 DIAGNOSIS — R30.0 DYSURIA: ICD-10-CM

## 2020-10-27 DIAGNOSIS — E78.2 MIXED HYPERLIPIDEMIA: ICD-10-CM

## 2020-10-27 LAB
ANION GAP SERPL CALC-SCNC: 5 MMOL/L (ref 5–15)
APPEARANCE UR: CLEAR
BACTERIA URNS QL MICRO: NEGATIVE /HPF
BILIRUB UR QL: NEGATIVE
BUN SERPL-MCNC: 10 MG/DL (ref 6–20)
BUN/CREAT SERPL: 15 (ref 12–20)
CALCIUM SERPL-MCNC: 9.1 MG/DL (ref 8.5–10.1)
CHLORIDE SERPL-SCNC: 106 MMOL/L (ref 97–108)
CO2 SERPL-SCNC: 29 MMOL/L (ref 21–32)
COLOR UR: NORMAL
CREAT SERPL-MCNC: 0.68 MG/DL (ref 0.55–1.02)
CREAT UR-MCNC: 39.2 MG/DL
EPITH CASTS URNS QL MICRO: NORMAL /LPF
GLUCOSE SERPL-MCNC: 97 MG/DL (ref 65–100)
GLUCOSE UR STRIP.AUTO-MCNC: NEGATIVE MG/DL
HBA1C MFR BLD HPLC: 8 %
HGB UR QL STRIP: NEGATIVE
HYALINE CASTS URNS QL MICRO: NORMAL /LPF (ref 0–5)
KETONES UR QL STRIP.AUTO: NEGATIVE MG/DL
LEUKOCYTE ESTERASE UR QL STRIP.AUTO: NEGATIVE
MICROALBUMIN UR-MCNC: <0.5 MG/DL
MICROALBUMIN/CREAT UR-RTO: NORMAL MG/G (ref 0–30)
NITRITE UR QL STRIP.AUTO: NEGATIVE
PH UR STRIP: 6.5 [PH] (ref 5–8)
POTASSIUM SERPL-SCNC: 3.8 MMOL/L (ref 3.5–5.1)
PROT UR STRIP-MCNC: NEGATIVE MG/DL
RBC #/AREA URNS HPF: NORMAL /HPF (ref 0–5)
SODIUM SERPL-SCNC: 140 MMOL/L (ref 136–145)
SP GR UR REFRACTOMETRY: 1.01 (ref 1–1.03)
UA: UC IF INDICATED,UAUC: NORMAL
UROBILINOGEN UR QL STRIP.AUTO: 0.2 EU/DL (ref 0.2–1)
WBC URNS QL MICRO: NORMAL /HPF (ref 0–4)

## 2020-10-27 PROCEDURE — 83036 HEMOGLOBIN GLYCOSYLATED A1C: CPT | Performed by: INTERNAL MEDICINE

## 2020-10-27 PROCEDURE — 3052F HG A1C>EQUAL 8.0%<EQUAL 9.0%: CPT | Performed by: INTERNAL MEDICINE

## 2020-10-27 PROCEDURE — 99214 OFFICE O/P EST MOD 30 MIN: CPT | Performed by: INTERNAL MEDICINE

## 2020-10-27 NOTE — LETTER
10/27/20 Patient: Paolo Chadwick YOB: 1960 Date of Visit: 10/27/2020 Anali Frey MD 
[address] [place] [address] VIA Facsimile: [phone] Dear Anali Frey MD, Thank you for referring Ms. Moreno Menjivar to [facility] for evaluation. My notes for this consultation are attached. If you have questions, please do not hesitate to call me. I look forward to following your patient along with you. Sincerely, Teofilo De La Rosa MD

## 2020-10-27 NOTE — PROGRESS NOTES
Emili Bowman MD                  Patient Information  Date:10/27/2020  Name : Davian Ashley 61 y.o.     YOB: 1960         Referred by: Rosa Platt MD         Chief Complaint   Patient presents with    Diabetes       History of Present Illness: Davian Ashley is a 61 y.o. female here for follow-up of  Type 2 Diabetes Mellitus. Type 2 Diabetes was diagnosed 1998 . End organ effects of diabetes: retinopathy. Cardiovascular risk factors: family history, dyslipidemia   Given the expense of brand medications she is on glipizide, metformin  She has the meter  Checking fasting which are all less than 110  Change the diet last 3 weeks  No hypoglycemia  She does not want to start SGLT2 inhibitors  She is on Metformin and glipizide        No chest pain, shortness of breath, dysphagia, occasional tingling, numbness    Wt Readings from Last 3 Encounters:   10/27/20 241 lb (109.3 kg)   07/27/20 241 lb (109.3 kg)   03/04/20 247 lb 8 oz (112.3 kg)       BP Readings from Last 3 Encounters:   10/27/20 138/63   07/27/20 152/75   03/04/20 142/75           Past Medical History:   Diagnosis Date    Hyperlipidemia     Hypertension     Menopause     Type 2 diabetes mellitus (HCC)      Current Outpatient Medications   Medication Sig    cranberry fruit extract (CRANBERRY PO) Take 1 Tab by mouth daily.  vitamin B complex (B COMPLEX PO) Take  by mouth.  albuterol (PROVENTIL VENTOLIN) 2.5 mg /3 mL (0.083 %) nebu by Nebulization route.  albuterol (PROVENTIL HFA, VENTOLIN HFA, PROAIR HFA) 90 mcg/actuation inhaler Take  by inhalation.  glipiZIDE (GLUCOTROL) 10 mg tablet Take 1 Tab by mouth two (2) times a day. Stop Glyburide    metFORMIN ER (GLUCOPHAGE XR) 500 mg tablet Take 2 Tabs by mouth two (2) times daily (with meals).     valsartan-hydroCHLOROthiazide (DIOVAN-HCT) 160-12.5 mg per tablet TAKE 1 TABLET BY MOUTH EVERY DAY    magnesium 250 mg tab Take  by mouth.  vit B Cmplx 3-FA-Vit C-Biotin (NEPHRO SERA RX) 1- mg-mg-mcg tablet Take 1 tablet by mouth daily.  vitamin E (AQUA GEMS) 400 unit capsule Take 1 capsule by mouth daily.  calcium-vitamin D (CALCIUM 600 + D,3,) 600 mg(1,500mg) -200 unit tab Take 1 tablet by mouth daily (with breakfast).  dapagliflozin (Farxiga) 10 mg tab tablet Take 1 Tab by mouth every morning.  cinnamon bark (CINNAMON PO) Take 1,000 mg by mouth daily. No current facility-administered medications for this visit. Allergies   Allergen Reactions    Linagliptin Swelling     Swelling of tongue     Nickel Rash       Review of Systems:  All 10 systems reviewed and are negative other than mentioned in HPI    Physical Examination:   Blood pressure 138/63, pulse 72, temperature 97.7 °F (36.5 °C), temperature source Oral, resp. rate 16, height 5' 5\" (1.651 m), weight 241 lb (109.3 kg), SpO2 97 %. Estimated body mass index is 40.1 kg/m² as calculated from the following:    Height as of this encounter: 5' 5\" (1.651 m). -   Weight as of this encounter: 241 lb (109.3 kg). - General: pleasant, no distress, good eye contact  - HEENT: no pallor, no periorbital edema, EOMI  - Neck: supple,   - Cardiovascular: regular,  normal S1 and S2  - Respiratory: clear to auscultation bilaterally  -   - Musculoskeletal: no proximal muscle weakness in upper or lower extremities  - Integumentary: no acanthosis nigricans  - Neurological: alert and oriented  - Psychiatric: normal mood and affect  - Skin: color, texture, turgor normal.     Diabetic foot exam: October 2018    Left Foot:   Visual Exam: normal    Pulse DP: 2+ (normal)   Filament test: normal sensation    Vibratory sensation: diminished      Right Foot:   Visual Exam: normal    Pulse DP: 2+ (normal)   Filament test: normal sensation    Vibratory sensation: diminished      Data Reviewed:       Assessment/Plan:     1.  Type 2 diabetes mellitus with hyperglycemia, without long-term current use of insulin ([facility] [address]    2. Essential hypertension    3. Mixed hyperlipidemia        1. Type 2 Diabetes Mellitus with  neuropathy,retinopathy  Lab Results   Component Value Date/Time    Hemoglobin A1c 8.6 (H) 02/28/2020 08:27 AM    Hemoglobin A1c (POC) 8 10/27/2020 03:32 PM    Hemoglobin A1c, External 7.6 08/31/2018     Uncontrolled  Metformin, glipizide  Did not want SGLT2 inhibitors, GLP-1 agonist  Hypoglycemia risk discussed    She had angioedema with Tradjentdomonique  She is an RN by profession,     Post micturition discomfort: Check UA  Hydration          2. HTN : Continue    3. Hyperlipidemia :  Declined statin    4. Obesity:Body mass index is 40.1 kg/m². Discussed about the importance of exercise and carbohydrate portion control. 5.  Sleep apnea:     6. Nodular goiter: Seen ENT  1.2 cm nodule left 3/19: This is not the cause for the dysphagia, GERD contributing        There are no Patient Instructions on file for this visit. Thank you for allowing me to participate in the care of this patient. Don Mace MD      Patient verbalized understanding     Voice-recognition software was used to generate this report, which may result in some phonetic-based errors in the grammar and contents. Even though attempts were made to correct all the mistakes, some may have been missed and remained in the body of the report.

## 2020-10-27 NOTE — PROGRESS NOTES
Paolo Chadwick is a 61 y.o. female here for   Chief Complaint   Patient presents with    Diabetes       1. Have you been to the ER, urgent care clinic since your last visit? Hospitalized since your last visit? -no    2. Have you seen or consulted any other health care providers outside of the [address] [place] [address] since your last visit? Include any pap smears or colon screening.-no    Having discomfort when urinating, discomfort lasts for a few hrs after urinating x few months.

## 2020-10-28 NOTE — PROGRESS NOTES
No urinary tract infection, kidney test is normal  If she continues to have the discomfort after urination to follow-up with PCP

## 2020-10-30 ENCOUNTER — TELEPHONE (OUTPATIENT)
Dept: ENDOCRINOLOGY | Age: 60
End: 2020-10-30

## 2020-10-30 NOTE — TELEPHONE ENCOUNTER
Per Dr. Foley Apo, informed pt of result note, as noted above. Pt verbalized understanding with no further questions or concerns at this time.

## 2020-10-30 NOTE — TELEPHONE ENCOUNTER
----- Message from Janeth Addison MD sent at 10/28/2020  8:51 AM EDT -----  No urinary tract infection, kidney test is normal  If she continues to have the discomfort after urination to follow-up with PCP

## 2020-11-14 ENCOUNTER — HOSPITAL ENCOUNTER (OUTPATIENT)
Dept: MAMMOGRAPHY | Age: 60
Discharge: HOME | End: 2020-11-14
Attending: INTERNAL MEDICINE
Payer: COMMERCIAL

## 2020-11-14 ENCOUNTER — TRANSCRIBE ORDER (OUTPATIENT)
Dept: REGISTRATION | Age: 60
End: 2020-11-14

## 2020-11-14 DIAGNOSIS — Z12.31 VISIT FOR SCREENING MAMMOGRAM: ICD-10-CM

## 2020-11-14 DIAGNOSIS — Z12.31 VISIT FOR SCREENING MAMMOGRAM: Primary | ICD-10-CM

## 2020-11-14 PROCEDURE — 77067 SCR MAMMO BI INCL CAD: CPT

## 2021-01-27 ENCOUNTER — OFFICE VISIT (OUTPATIENT)
Dept: ENDOCRINOLOGY | Age: 61
End: 2021-01-27
Payer: COMMERCIAL

## 2021-01-27 VITALS
TEMPERATURE: 98.2 F | BODY MASS INDEX: 39.49 KG/M2 | HEIGHT: 65 IN | WEIGHT: 237 LBS | RESPIRATION RATE: 18 BRPM | HEART RATE: 84 BPM | OXYGEN SATURATION: 100 % | SYSTOLIC BLOOD PRESSURE: 132 MMHG | DIASTOLIC BLOOD PRESSURE: 81 MMHG

## 2021-01-27 DIAGNOSIS — E78.2 MIXED HYPERLIPIDEMIA: ICD-10-CM

## 2021-01-27 DIAGNOSIS — E11.65 TYPE 2 DIABETES MELLITUS WITH HYPERGLYCEMIA, WITHOUT LONG-TERM CURRENT USE OF INSULIN (HCC): Primary | ICD-10-CM

## 2021-01-27 DIAGNOSIS — I10 ESSENTIAL HYPERTENSION: ICD-10-CM

## 2021-01-27 LAB — HBA1C MFR BLD HPLC: 8.4 %

## 2021-01-27 PROCEDURE — 3052F HG A1C>EQUAL 8.0%<EQUAL 9.0%: CPT | Performed by: INTERNAL MEDICINE

## 2021-01-27 PROCEDURE — 99214 OFFICE O/P EST MOD 30 MIN: CPT | Performed by: INTERNAL MEDICINE

## 2021-01-27 PROCEDURE — 83036 HEMOGLOBIN GLYCOSYLATED A1C: CPT | Performed by: INTERNAL MEDICINE

## 2021-01-27 RX ORDER — CHOLECALCIFEROL (VITAMIN D3) 125 MCG
1 CAPSULE ORAL DAILY
COMMUNITY

## 2021-01-27 RX ORDER — ASCORBIC ACID 500 MG
500 TABLET ORAL DAILY
COMMUNITY

## 2021-01-27 NOTE — PROGRESS NOTES
Denice Marie MD          Patient Information  Date:1/27/2021  Name : Elaine Shoemakre 61 y.o.     YOB: 1960         Referred by: Venessa Massey MD         Chief Complaint   Patient presents with    Diabetes       History of Present Illness: Elaine Shoemaker is a 61 y.o. female here for follow-up of  Type 2 Diabetes Mellitus. Type 2 Diabetes was diagnosed 1998 . End organ effects of diabetes: retinopathy. Cardiovascular risk factors: family history, dyslipidemia   Given the expense of brand medications she is on glipizide, metformin   no meter   A1C higher    No hypoglycemia  She does not want to start SGLT2 inhibitors  She is on Metformin and glipizide        No chest pain, shortness of breath, dysphagia, occasional tingling, numbness    Wt Readings from Last 3 Encounters:   01/27/21 237 lb (107.5 kg)   10/27/20 241 lb (109.3 kg)   07/27/20 241 lb (109.3 kg)       BP Readings from Last 3 Encounters:   01/27/21 132/81   10/27/20 138/63   07/27/20 152/75           Past Medical History:   Diagnosis Date    Hyperlipidemia     Hypertension     Menopause     Type 2 diabetes mellitus (HCC)      Current Outpatient Medications   Medication Sig    elderberry fruit (ELDERBERRY PO) Take 1 Tab by mouth daily.  cholecalciferol, vitamin D3, (Vitamin D3) 50 mcg (2,000 unit) tab Take 1 Tab by mouth daily.  ascorbic acid, vitamin C, (Vitamin C) 500 mg tablet Take 500 mg by mouth daily.  ZINC PO Take 1 Tab by mouth daily.  vitamin B complex (B COMPLEX PO) Take  by mouth.  albuterol (PROVENTIL VENTOLIN) 2.5 mg /3 mL (0.083 %) nebu by Nebulization route.  albuterol (PROVENTIL HFA, VENTOLIN HFA, PROAIR HFA) 90 mcg/actuation inhaler Take  by inhalation.  glipiZIDE (GLUCOTROL) 10 mg tablet Take 1 Tab by mouth two (2) times a day.  Stop Glyburide    metFORMIN ER (GLUCOPHAGE XR) 500 mg tablet Take 2 Tabs by mouth two (2) times daily (with meals).  valsartan-hydroCHLOROthiazide (DIOVAN-HCT) 160-12.5 mg per tablet TAKE 1 TABLET BY MOUTH EVERY DAY    magnesium 250 mg tab Take  by mouth.  vit B Cmplx 3-FA-Vit C-Biotin (NEPHRO SERA RX) 1- mg-mg-mcg tablet Take 1 tablet by mouth daily.  vitamin E (AQUA GEMS) 400 unit capsule Take 1 capsule by mouth daily.  calcium-vitamin D (CALCIUM 600 + D,3,) 600 mg(1,500mg) -200 unit tab Take 1 tablet by mouth daily (with breakfast).  dapagliflozin (Farxiga) 10 mg tab tablet Take 1 Tab by mouth every morning.  cinnamon bark (CINNAMON PO) Take 1,000 mg by mouth daily. No current facility-administered medications for this visit. Allergies   Allergen Reactions    Linagliptin Swelling     Swelling of tongue     Nickel Rash       Review of Systems: Per HPI    Physical Examination:   Blood pressure 132/81, pulse 84, temperature 98.2 °F (36.8 °C), temperature source Oral, resp. rate 18, height 5' 5\" (1.651 m), weight 237 lb (107.5 kg), SpO2 100 %. Estimated body mass index is 39.44 kg/m² as calculated from the following:    Height as of this encounter: 5' 5\" (1.651 m). -   Weight as of this encounter: 237 lb (107.5 kg).   - General: pleasant, no distress, good eye contact  - HEENT: no pallor, no periorbital edema, EOMI  - Neck: supple,   - Cardiovascular: regular,  normal S1 and S2  - Respiratory: clear to auscultation bilaterally  - Edema since she was 13years old, no etiology  - Musculoskeletal: no proximal muscle weakness in upper or lower extremities  -   - Neurological: alert and oriented  - Psychiatric: normal mood and affect  - Skin: color, texture, turgor normal.     Diabetic foot exam: October 2018    Left Foot:   Visual Exam: normal    Pulse DP: 2+ (normal)   Filament test: normal sensation    Vibratory sensation: diminished      Right Foot:   Visual Exam: normal    Pulse DP: 2+ (normal)   Filament test: normal sensation    Vibratory sensation: diminished      Data Reviewed: Assessment/Plan:     1. Type 2 diabetes mellitus with hyperglycemia, without long-term current use of insulin ([facility] [address]    2. Essential hypertension    3. Mixed hyperlipidemia        1. Type 2 Diabetes Mellitus with  neuropathy,retinopathy  Lab Results   Component Value Date/Time    Hemoglobin A1c 8.6 (H) 02/28/2020 08:27 AM    Hemoglobin A1c (POC) 8.4 01/27/2021 04:17 PM    Hemoglobin A1c, External 7.6 08/31/2018     Uncontrolled  Metformin, glipizide  Did not want SGLT2 inhibitors, GLP-1 agonist  Wants a trial of lifestyle changes    She had angioedema with Tradjenta  She is an RN by profession,       2. HTN : Continue    3. Hyperlipidemia :  Declined statin    4. Obesity:Body mass index is 39.44 kg/m². Discussed about the importance of exercise and carbohydrate portion control. 5.  Sleep apnea: resolved per pt     6. Nodular goiter: Seen ENT  1.2 cm nodule left 3/19: This is not the cause for the dysphagia, GERD contributing    Chronic edema -since she was 13years old , no etiology found, information of Dr Amaury Wilcox given for further evaluation of venous insufficiency  The edema is bothering her      There are no Patient Instructions on file for this visit. Thank you for allowing me to participate in the care of this patient. Christiano Rivera MD      Patient verbalized understanding     Voice-recognition software was used to generate this report, which may result in some phonetic-based errors in the grammar and contents. Even though attempts were made to correct all the mistakes, some may have been missed and remained in the body of the report.

## 2021-01-27 NOTE — LETTER
1/30/2021 Patient: Alma Morales YOB: 1960 Date of Visit: 1/27/2021 Maday Hope MD 
[address] [place] [id number] [address] Fax: [phone] Dear Maday Hope MD, Thank you for referring Ms. Susan North to [facility] for evaluation. My notes for this consultation are attached. If you have questions, please do not hesitate to call me. I look forward to following your patient along with you. Sincerely, Nick Abdi MD

## 2021-01-27 NOTE — PROGRESS NOTES
Lan Prince is a 61 y.o. female here for   Chief Complaint   Patient presents with    Diabetes       1. Have you been to the ER, urgent care clinic since your last visit? Hospitalized since your last visit? -no    2. Have you seen or consulted any other health care providers outside of the [address] [place] [address] since your last visit?   Include any pap smears or colon screening.-no    Order placed for pt per verbal order with read back from Dr. Barbara Rust 01/27/21

## 2021-03-08 DIAGNOSIS — I10 ESSENTIAL HYPERTENSION: ICD-10-CM

## 2021-03-08 DIAGNOSIS — E11.65 TYPE 2 DIABETES MELLITUS WITH HYPERGLYCEMIA, WITHOUT LONG-TERM CURRENT USE OF INSULIN (HCC): ICD-10-CM

## 2021-03-08 RX ORDER — METFORMIN HYDROCHLORIDE 500 MG/1
1000 TABLET, EXTENDED RELEASE ORAL 2 TIMES DAILY WITH MEALS
Qty: 360 TAB | Refills: 3 | Status: SHIPPED | OUTPATIENT
Start: 2021-03-08 | End: 2021-10-15 | Stop reason: SDUPTHER

## 2021-04-17 DIAGNOSIS — E11.65 TYPE 2 DIABETES MELLITUS WITH HYPERGLYCEMIA, WITHOUT LONG-TERM CURRENT USE OF INSULIN (HCC): ICD-10-CM

## 2021-04-17 DIAGNOSIS — I10 ESSENTIAL HYPERTENSION: ICD-10-CM

## 2021-04-17 RX ORDER — GLIPIZIDE 10 MG/1
TABLET ORAL
Qty: 180 TAB | Refills: 3 | Status: SHIPPED | OUTPATIENT
Start: 2021-04-17 | End: 2021-10-15 | Stop reason: SDUPTHER

## 2021-05-16 DIAGNOSIS — I10 ESSENTIAL HYPERTENSION: ICD-10-CM

## 2021-05-16 DIAGNOSIS — E11.65 TYPE 2 DIABETES MELLITUS WITH HYPERGLYCEMIA, WITHOUT LONG-TERM CURRENT USE OF INSULIN (HCC): ICD-10-CM

## 2021-05-16 RX ORDER — VALSARTAN AND HYDROCHLOROTHIAZIDE 160; 12.5 MG/1; MG/1
TABLET, FILM COATED ORAL
Qty: 90 TAB | Refills: 3 | Status: SHIPPED | OUTPATIENT
Start: 2021-05-16 | End: 2021-10-15 | Stop reason: SDUPTHER

## 2021-06-14 ENCOUNTER — OFFICE VISIT (OUTPATIENT)
Dept: ENDOCRINOLOGY | Age: 61
End: 2021-06-14
Payer: COMMERCIAL

## 2021-06-14 VITALS — BODY MASS INDEX: 40.48 KG/M2 | WEIGHT: 243 LBS | HEIGHT: 65 IN

## 2021-06-14 DIAGNOSIS — E78.2 MIXED HYPERLIPIDEMIA: ICD-10-CM

## 2021-06-14 DIAGNOSIS — I10 ESSENTIAL HYPERTENSION: ICD-10-CM

## 2021-06-14 DIAGNOSIS — E11.65 TYPE 2 DIABETES MELLITUS WITH HYPERGLYCEMIA, WITHOUT LONG-TERM CURRENT USE OF INSULIN (HCC): Primary | ICD-10-CM

## 2021-06-14 PROCEDURE — 3052F HG A1C>EQUAL 8.0%<EQUAL 9.0%: CPT | Performed by: INTERNAL MEDICINE

## 2021-06-14 PROCEDURE — 99214 OFFICE O/P EST MOD 30 MIN: CPT | Performed by: INTERNAL MEDICINE

## 2021-06-14 NOTE — PROGRESS NOTES
Adan Kumari MD          Patient Information  Date:6/14/2021  Name : Ronna Schuster 61 y.o.     YOB: 1960         Referred by: Odin Pack MD         Chief Complaint   Patient presents with    Diabetes       History of Present Illness: Ronna Schuster is a 61 y.o. female here for follow-up of  Type 2 Diabetes Mellitus. Type 2 Diabetes was diagnosed 1998 . End organ effects of diabetes: retinopathy. Cardiovascular risk factors: family history, dyslipidemia   Given the expense of brand medications she is on glipizide, metformin  She did not bring the meter  A1C higher    No hypoglycemia  She does not want to start SGLT2 inhibitors  She is on Metformin and glipizide  Chronic lower extremity edema      Wt Readings from Last 3 Encounters:   06/14/21 243 lb (110.2 kg)   01/27/21 237 lb (107.5 kg)   10/27/20 241 lb (109.3 kg)       BP Readings from Last 3 Encounters:   06/14/21 (!) (P) 141/68   01/27/21 132/81   10/27/20 138/63           Past Medical History:   Diagnosis Date    Hyperlipidemia     Hypertension     Menopause     Type 2 diabetes mellitus (HCC)      Current Outpatient Medications   Medication Sig    valsartan-hydroCHLOROthiazide (DIOVAN-HCT) 160-12.5 mg per tablet TAKE 1 TABLET BY MOUTH EVERY DAY    glipiZIDE (GLUCOTROL) 10 mg tablet TAKE 1 TABLET BY MOUTH TWO (2) TIMES A DAY. STOP GLYBURIDE    metFORMIN ER (GLUCOPHAGE XR) 500 mg tablet TAKE 2 TABS BY MOUTH TWO (2) TIMES DAILY (WITH MEALS).  cholecalciferol, vitamin D3, (Vitamin D3) 50 mcg (2,000 unit) tab Take 1 Tab by mouth daily.  ascorbic acid, vitamin C, (Vitamin C) 500 mg tablet Take 500 mg by mouth daily.  ZINC PO Take 1 Tab by mouth daily.  albuterol (PROVENTIL VENTOLIN) 2.5 mg /3 mL (0.083 %) nebu by Nebulization route.  albuterol (PROVENTIL HFA, VENTOLIN HFA, PROAIR HFA) 90 mcg/actuation inhaler Take  by inhalation.     magnesium 250 mg tab Take  by mouth.  vit B Cmplx 3-FA-Vit C-Biotin (NEPHRO SERA RX) 1- mg-mg-mcg tablet Take 1 tablet by mouth daily.  vitamin E (AQUA GEMS) 400 unit capsule Take 1 capsule by mouth daily.  calcium-vitamin D (CALCIUM 600 + D,3,) 600 mg(1,500mg) -200 unit tab Take 1 tablet by mouth daily (with breakfast).  elderberry fruit (ELDERBERRY PO) Take 1 Tab by mouth daily. (Patient not taking: Reported on 6/14/2021)    vitamin B complex (B COMPLEX PO) Take  by mouth. (Patient not taking: Reported on 6/14/2021)     No current facility-administered medications for this visit. Allergies   Allergen Reactions    Linagliptin Swelling     Swelling of tongue     Nickel Rash       Review of Systems: Per HPI    Physical Examination:   Blood pressure (!) (P) 141/68, pulse (P) 78, temperature (P) 97.8 °F (36.6 °C), height 5' 5\" (1.651 m), weight 243 lb (110.2 kg), SpO2 (P) 94 %. Estimated body mass index is 40.44 kg/m² as calculated from the following:    Height as of this encounter: 5' 5\" (1.651 m). -   Weight as of this encounter: 243 lb (110.2 kg). - General: pleasant, no distress, good eye contact  - HEENT: no pallor, no periorbital edema, EOMI  - Neck: supple,   - Cardiovascular: regular,  normal S1 and S2  - Respiratory: clear to auscultation bilaterally  - Edema since she was 13years old, no etiology  -   - Neurological: alert and oriented  - Psychiatric: normal mood and affect  - Skin: color, texture, turgor normal.     Diabetic foot exam: October 2018    Left Foot:   Visual Exam: normal    Pulse DP: 2+ (normal)   Filament test: normal sensation    Vibratory sensation: diminished      Right Foot:   Visual Exam: normal    Pulse DP: 2+ (normal)   Filament test: normal sensation    Vibratory sensation: diminished    Diabetic Foot and Eye Exam  Status   Topic Date Due    Eye Exam  03/21/2021    Diabetic Foot Care  06/14/2022       Data Reviewed:       Assessment/Plan:     1.  Type 2 diabetes mellitus with hyperglycemia, without long-term current use of insulin ([facility] [address]    2. Essential hypertension    3. Mixed hyperlipidemia        1. Type 2 Diabetes Mellitus with  neuropathy,retinopathy  Lab Results   Component Value Date/Time    Hemoglobin A1c 8.6 (H) 02/28/2020 08:27 AM    Hemoglobin A1c (POC) 8.4 01/27/2021 04:17 PM    Hemoglobin A1c, External 7.6 08/31/2018 12:00 AM     Uncontrolled  Metformin, glipizide  Did not want SGLT2 inhibitors, GLP-1 agonist she is worried about the side effects, discussed the benefits in terms of CVD, renal, she is not convinced  She had angioedema with Tradjenta  Cannot use Actos due to lower extremity edema  She is an RN by profession,       2. HTN : Continue    3. Hyperlipidemia :  Declined statin    4. Obesity:Body mass index is 40.44 kg/m². Discussed about the importance of exercise and carbohydrate portion control. 5.  Sleep apnea: resolved per pt     6. Nodular goiter: Seen ENT  1.2 cm nodule left 3/19: This is not the cause for the dysphagia, GERD contributing    Chronic edema -since she was 13years old , no etiology found, information of Dr Lionel Cabrera given for further evaluation of venous insufficiency, could not see him  The edema is bothering her      There are no Patient Instructions on file for this visit. Thank you for allowing me to participate in the care of this patient. Nadine Panchal MD      Patient verbalized understanding     Voice-recognition software was used to generate this report, which may result in some phonetic-based errors in the grammar and contents. Even though attempts were made to correct all the mistakes, some may have been missed and remained in the body of the report.

## 2021-06-14 NOTE — LETTER
6/14/2021 Patient: Valeria Pallas YOB: 1960 Date of Visit: 6/14/2021 Margret Alvarez MD 
[address] [place] [id number] [address] Fax: [phone] Dear Margret Alvarez MD, Thank you for referring Ms. Lindsay Rodriguez to [facility] for evaluation. My notes for this consultation are attached. If you have questions, please do not hesitate to call me. I look forward to following your patient along with you. Sincerely, Debra Barrow MD

## 2021-06-15 LAB
ALBUMIN/CREAT UR: 4 MG/G CREAT (ref 0–29)
BUN SERPL-MCNC: 13 MG/DL (ref 8–27)
BUN/CREAT SERPL: 17 (ref 12–28)
CALCIUM SERPL-MCNC: 9.7 MG/DL (ref 8.7–10.3)
CHLORIDE SERPL-SCNC: 103 MMOL/L (ref 96–106)
CO2 SERPL-SCNC: 25 MMOL/L (ref 20–29)
CREAT SERPL-MCNC: 0.75 MG/DL (ref 0.57–1)
CREAT UR-MCNC: 215.5 MG/DL
EST. AVERAGE GLUCOSE BLD GHB EST-MCNC: 203 MG/DL
GLUCOSE SERPL-MCNC: 84 MG/DL (ref 65–99)
HBA1C MFR BLD: 8.7 % (ref 4.8–5.6)
LDLC SERPL DIRECT ASSAY-MCNC: 100 MG/DL (ref 0–99)
MICROALBUMIN UR-MCNC: 8 UG/ML
POTASSIUM SERPL-SCNC: 3.9 MMOL/L (ref 3.5–5.2)
SODIUM SERPL-SCNC: 142 MMOL/L (ref 134–144)

## 2021-09-29 DIAGNOSIS — E11.65 TYPE 2 DIABETES MELLITUS WITH HYPERGLYCEMIA, WITHOUT LONG-TERM CURRENT USE OF INSULIN (HCC): Primary | ICD-10-CM

## 2021-09-29 RX ORDER — SEMAGLUTIDE 1.34 MG/ML
1 INJECTION, SOLUTION SUBCUTANEOUS
Qty: 3 EACH | Refills: 3 | Status: SHIPPED | OUTPATIENT
Start: 2021-09-29 | End: 2021-10-15

## 2021-10-14 NOTE — PROGRESS NOTES
Javed Herring is a 64 y.o. female here for   Chief Complaint   Patient presents with    Diabetes       1. Have you been to the ER, urgent care clinic since your last visit? Hospitalized since your last visit? -no    2. Have you seen or consulted any other health care providers outside of the [address] [place] [address] since your last visit?   Include any pap smears or colon screening.-no

## 2021-10-14 NOTE — PROGRESS NOTES
Sheryl Gann MD          Patient Information  Date:10/15/2021  Name : Chaya Gottron 64 y.o.     YOB: 1960         Referred by: Moreno Barahona MD         Chief Complaint   Patient presents with    Diabetes       History of Present Illness: Chaya Gottron is a 64 y.o. female here for follow-up of  Type 2 Diabetes Mellitus. Type 2 Diabetes was diagnosed 1998 . End organ effects of diabetes: retinopathy. Cardiovascular risk factors: family history, dyslipidemia   Given the expense of brand medications she is on glipizide, metformin  She has the meter, checking the blood glucose once daily, fasting 107-140, A1c 8.5  Did not start Ozempic, she did not like the side effects she read about    No hypoglycemia  More stress at home, drinking diet sodas  Chronic lower extremity edema      Wt Readings from Last 3 Encounters:   10/15/21 239 lb (108.4 kg)   06/14/21 243 lb (110.2 kg)   01/27/21 237 lb (107.5 kg)       BP Readings from Last 3 Encounters:   10/15/21 136/70   06/14/21 (!) (P) 141/68   01/27/21 132/81           Past Medical History:   Diagnosis Date    Hyperlipidemia     Hypertension     Menopause     Type 2 diabetes mellitus (HCC)      Current Outpatient Medications   Medication Sig    valsartan-hydroCHLOROthiazide (DIOVAN-HCT) 160-12.5 mg per tablet TAKE 1 TABLET BY MOUTH EVERY DAY    glipiZIDE (GLUCOTROL) 10 mg tablet TAKE 1 TABLET BY MOUTH TWO (2) TIMES A DAY. STOP GLYBURIDE    metFORMIN ER (GLUCOPHAGE XR) 500 mg tablet TAKE 2 TABS BY MOUTH TWO (2) TIMES DAILY (WITH MEALS).  cholecalciferol, vitamin D3, (Vitamin D3) 50 mcg (2,000 unit) tab Take 1 Tab by mouth daily.  ascorbic acid, vitamin C, (Vitamin C) 500 mg tablet Take 500 mg by mouth daily.  ZINC PO Take 1 Tab by mouth daily.  albuterol (PROVENTIL VENTOLIN) 2.5 mg /3 mL (0.083 %) nebu by Nebulization route.     albuterol (PROVENTIL HFA, VENTOLIN HFA, PROAIR HFA) 90 mcg/actuation inhaler Take  by inhalation.  magnesium 250 mg tab Take  by mouth.  vit B Cmplx 3-FA-Vit C-Biotin (NEPHRO SERA RX) 1- mg-mg-mcg tablet Take 1 tablet by mouth daily.  vitamin E (AQUA GEMS) 400 unit capsule Take 1 capsule by mouth daily.  calcium-vitamin D (CALCIUM 600 + D,3,) 600 mg(1,500mg) -200 unit tab Take 1 tablet by mouth daily (with breakfast).  semaglutide (Ozempic) 1 mg/dose (4 mg/3 mL) pnij 1 mg by SubCUTAneous route every seven (7) days. Stop 0.5 mg (Patient not taking: Reported on 10/15/2021)    elderberry fruit (ELDERBERRY PO) Take 1 Tab by mouth daily. (Patient not taking: Reported on 6/14/2021)    vitamin B complex (B COMPLEX PO) Take  by mouth. (Patient not taking: Reported on 6/14/2021)     No current facility-administered medications for this visit. Allergies   Allergen Reactions    Linagliptin Swelling     Swelling of tongue     Nickel Rash       Review of Systems: Per HPI    Physical Examination:   Blood pressure 136/70, pulse 92, temperature (!) 96.6 °F (35.9 °C), temperature source Temporal, height 5' 5\" (1.651 m), weight 239 lb (108.4 kg), SpO2 98 %. Estimated body mass index is 39.77 kg/m² as calculated from the following:    Height as of this encounter: 5' 5\" (1.651 m). -   Weight as of this encounter: 239 lb (108.4 kg).   - General: pleasant, no distress, good eye contact  - HEENT: no pallor, no periorbital edema, EOMI  - Neck: supple,   - Cardiovascular: regular,  normal S1 and S2  - Respiratory: clear to auscultation bilaterally  - Edema since she was 13years old, no etiology  -   - Neurological: alert and oriented  - Psychiatric: normal mood and affect  - Skin: color, texture, turgor normal.     Diabetic foot exam: October 2018    Left Foot:   Visual Exam: normal    Pulse DP: 2+ (normal)   Filament test: normal sensation    Vibratory sensation: diminished      Right Foot:   Visual Exam: normal    Pulse DP: 2+ (normal)   Filament test: normal sensation    Vibratory sensation: diminished    Diabetic Foot and Eye Exam HM Status   Topic Date Due    Eye Exam  03/21/2021    Diabetic Foot Care  10/15/2022       Data Reviewed:       Assessment/Plan:     1. Type 2 diabetes mellitus with hyperglycemia, without long-term current use of insulin ([facility] [address]    2. Essential hypertension    3. Mixed hyperlipidemia        1. Type 2 Diabetes Mellitus with  neuropathy,retinopathy  Lab Results   Component Value Date/Time    Hemoglobin A1c 8.7 (H) 06/12/2021 09:01 AM    Hemoglobin A1c (POC) 8.5 10/15/2021 08:36 AM    Hemoglobin A1c, External 7.6 08/31/2018 12:00 AM     Uncontrolled  Metformin, glipizide  Ozempic -did not like the side effects described and hence did not start the medication. Risks and benefits discussed again, benefits more than risks. Does not want SGLT2 inhibitors. She had angioedema with Tradjenta  Cannot use Actos due to lower extremity edema  She is an RN by profession,   Does not want to start any additional medications at this point    2. HTN : Continue    3. Hyperlipidemia :  Declined statin    4. Obesity:Body mass index is 39.77 kg/m². Discussed about the importance of exercise and carbohydrate portion control. 5.  Sleep apnea: resolved per pt     6. Nodular goiter: Seen ENT  1.2 cm nodule left 3/19: This is not the cause for the dysphagia, GERD contributing    Chronic edema -since she was 13years old , no etiology found, information of Dr Jazmyn Barron was given for further evaluation of venous insufficiency, could not see him        There are no Patient Instructions on file for this visit. Thank you for allowing me to participate in the care of this patient. Saima Lauren MD      Patient verbalized understanding     Voice-recognition software was used to generate this report, which may result in some phonetic-based errors in the grammar and contents.   Even though attempts were made to correct all the mistakes, some may have been missed and remained in the body of the report.

## 2021-10-15 ENCOUNTER — OFFICE VISIT (OUTPATIENT)
Dept: ENDOCRINOLOGY | Age: 61
End: 2021-10-15
Payer: COMMERCIAL

## 2021-10-15 VITALS
HEART RATE: 92 BPM | BODY MASS INDEX: 39.82 KG/M2 | SYSTOLIC BLOOD PRESSURE: 136 MMHG | HEIGHT: 65 IN | WEIGHT: 239 LBS | OXYGEN SATURATION: 98 % | DIASTOLIC BLOOD PRESSURE: 70 MMHG | TEMPERATURE: 96.6 F

## 2021-10-15 DIAGNOSIS — E78.2 MIXED HYPERLIPIDEMIA: ICD-10-CM

## 2021-10-15 DIAGNOSIS — I10 ESSENTIAL HYPERTENSION: ICD-10-CM

## 2021-10-15 DIAGNOSIS — E11.65 TYPE 2 DIABETES MELLITUS WITH HYPERGLYCEMIA, WITHOUT LONG-TERM CURRENT USE OF INSULIN (HCC): Primary | ICD-10-CM

## 2021-10-15 LAB — HBA1C MFR BLD HPLC: 8.5 %

## 2021-10-15 PROCEDURE — 99214 OFFICE O/P EST MOD 30 MIN: CPT | Performed by: INTERNAL MEDICINE

## 2021-10-15 PROCEDURE — 3052F HG A1C>EQUAL 8.0%<EQUAL 9.0%: CPT | Performed by: INTERNAL MEDICINE

## 2021-10-15 PROCEDURE — 83036 HEMOGLOBIN GLYCOSYLATED A1C: CPT | Performed by: INTERNAL MEDICINE

## 2021-10-15 RX ORDER — METFORMIN HYDROCHLORIDE 500 MG/1
1000 TABLET, EXTENDED RELEASE ORAL 2 TIMES DAILY WITH MEALS
Qty: 360 TABLET | Refills: 3 | Status: SHIPPED | OUTPATIENT
Start: 2021-10-15 | End: 2022-07-11 | Stop reason: SDUPTHER

## 2021-10-15 RX ORDER — GLIPIZIDE 10 MG/1
TABLET ORAL
Qty: 180 TABLET | Refills: 3 | Status: SHIPPED | OUTPATIENT
Start: 2021-10-15 | End: 2022-07-11 | Stop reason: SDUPTHER

## 2021-10-15 RX ORDER — VALSARTAN AND HYDROCHLOROTHIAZIDE 160; 12.5 MG/1; MG/1
1 TABLET, FILM COATED ORAL DAILY
Qty: 90 TABLET | Refills: 3 | Status: SHIPPED | OUTPATIENT
Start: 2021-10-15 | End: 2022-07-11 | Stop reason: SDUPTHER

## 2021-10-15 NOTE — LETTER
10/15/2021    Patient: Tim Martin   YOB: 1960   Date of Visit: 10/15/2021     [phone]  Via Fax: [phone]    Dear Suhail Palmer MD,      Thank you for referring Ms. Marcus Yi to [facility] for evaluation. My notes for this consultation are attached. If you have questions, please do not hesitate to call me. I look forward to following your patient along with you.       Sincerely,    Benedicto Contreras MD

## 2021-11-20 ENCOUNTER — TRANSCRIBE ORDER (OUTPATIENT)
Dept: REGISTRATION | Age: 61
End: 2021-11-20

## 2021-11-20 ENCOUNTER — HOSPITAL ENCOUNTER (OUTPATIENT)
Dept: MAMMOGRAPHY | Age: 61
Discharge: HOME | End: 2021-11-20
Attending: INTERNAL MEDICINE
Payer: COMMERCIAL

## 2021-11-20 DIAGNOSIS — Z12.31 VISIT FOR SCREENING MAMMOGRAM: Primary | ICD-10-CM

## 2021-11-20 DIAGNOSIS — Z12.31 VISIT FOR SCREENING MAMMOGRAM: ICD-10-CM

## 2021-11-20 PROCEDURE — 77063 BREAST TOMOSYNTHESIS BI: CPT

## 2022-02-23 LAB
ALBUMIN SERPL-MCNC: 3.9 G/DL (ref 3.8–4.8)
ALBUMIN/CREAT UR: 4 MG/G CREAT (ref 0–29)
ALBUMIN/GLOB SERPL: 1.5 {RATIO} (ref 1.2–2.2)
ALP SERPL-CCNC: 85 IU/L (ref 44–121)
ALT SERPL-CCNC: 20 IU/L (ref 0–32)
AST SERPL-CCNC: 20 IU/L (ref 0–40)
BILIRUB SERPL-MCNC: <0.2 MG/DL (ref 0–1.2)
BUN SERPL-MCNC: 15 MG/DL (ref 8–27)
BUN/CREAT SERPL: 20 (ref 12–28)
CALCIUM SERPL-MCNC: 9.1 MG/DL (ref 8.7–10.3)
CHLORIDE SERPL-SCNC: 98 MMOL/L (ref 96–106)
CO2 SERPL-SCNC: 24 MMOL/L (ref 20–29)
CREAT SERPL-MCNC: 0.75 MG/DL (ref 0.57–1)
CREAT UR-MCNC: 165.4 MG/DL
EST. AVERAGE GLUCOSE BLD GHB EST-MCNC: 194 MG/DL
GLOBULIN SER CALC-MCNC: 2.6 G/DL (ref 1.5–4.5)
GLUCOSE SERPL-MCNC: 137 MG/DL (ref 65–99)
HBA1C MFR BLD: 8.4 % (ref 4.8–5.6)
LDLC SERPL DIRECT ASSAY-MCNC: 102 MG/DL (ref 0–99)
MICROALBUMIN UR-MCNC: 7.4 UG/ML
POTASSIUM SERPL-SCNC: 4 MMOL/L (ref 3.5–5.2)
PROT SERPL-MCNC: 6.5 G/DL (ref 6–8.5)
SODIUM SERPL-SCNC: 138 MMOL/L (ref 134–144)

## 2022-02-24 ENCOUNTER — OFFICE VISIT (OUTPATIENT)
Dept: ENDOCRINOLOGY | Age: 62
End: 2022-02-24
Payer: COMMERCIAL

## 2022-02-24 VITALS
RESPIRATION RATE: 18 BRPM | SYSTOLIC BLOOD PRESSURE: 141 MMHG | BODY MASS INDEX: 38.45 KG/M2 | OXYGEN SATURATION: 99 % | DIASTOLIC BLOOD PRESSURE: 68 MMHG | WEIGHT: 230.8 LBS | HEIGHT: 65 IN | HEART RATE: 82 BPM | TEMPERATURE: 97.8 F

## 2022-02-24 DIAGNOSIS — E78.2 MIXED HYPERLIPIDEMIA: ICD-10-CM

## 2022-02-24 DIAGNOSIS — E11.65 TYPE 2 DIABETES MELLITUS WITH HYPERGLYCEMIA, WITHOUT LONG-TERM CURRENT USE OF INSULIN (HCC): Primary | ICD-10-CM

## 2022-02-24 DIAGNOSIS — I10 ESSENTIAL HYPERTENSION: ICD-10-CM

## 2022-02-24 PROCEDURE — 99214 OFFICE O/P EST MOD 30 MIN: CPT | Performed by: INTERNAL MEDICINE

## 2022-02-24 PROCEDURE — 3052F HG A1C>EQUAL 8.0%<EQUAL 9.0%: CPT | Performed by: INTERNAL MEDICINE

## 2022-02-24 NOTE — PROGRESS NOTES
Mor Marin is a 64 y.o. female here for   Chief Complaint   Patient presents with    Diabetes       1. Have you been to the ER, urgent care clinic since your last visit? Hospitalized since your last visit? - no    2. Have you seen or consulted any other health care providers outside of the [address] [place] [address] since your last visit?   Include any pap smears or colon screening.- no

## 2022-02-24 NOTE — PROGRESS NOTES
Ayesha Smallwood MD          Patient Information  Date:2/24/2022  Name : Edmundo Spear 64 y.o.     YOB: 1960         Referred by: Sarai Elias MD         Chief Complaint   Patient presents with    Diabetes       History of Present Illness: Edmundo Spear is a 64 y.o. female here for follow-up of  Type 2 Diabetes Mellitus. Type 2 Diabetes was diagnosed 1998 . End organ effects of diabetes: retinopathy. Cardiovascular risk factors: family history, dyslipidemia   Given the expense of brand medications she is on glipizide, metformin  No meter    No hypoglycemia    Chronic lower extremity edema      Wt Readings from Last 3 Encounters:   02/24/22 230 lb 12.8 oz (104.7 kg)   10/15/21 239 lb (108.4 kg)   06/14/21 243 lb (110.2 kg)       BP Readings from Last 3 Encounters:   02/24/22 (!) 141/68   10/15/21 136/70   06/14/21 (!) (P) 141/68           Past Medical History:   Diagnosis Date    Hyperlipidemia     Hypertension     Menopause     Type 2 diabetes mellitus (HCC)      Current Outpatient Medications   Medication Sig    glipiZIDE (GLUCOTROL) 10 mg tablet TAKE 1 TABLET BY MOUTH TWO (2) TIMES A DAY. STOP GLYBURIDE    metFORMIN ER (GLUCOPHAGE XR) 500 mg tablet Take 2 Tablets by mouth two (2) times daily (with meals).  valsartan-hydroCHLOROthiazide (DIOVAN-HCT) 160-12.5 mg per tablet Take 1 Tablet by mouth daily.  cholecalciferol, vitamin D3, (Vitamin D3) 50 mcg (2,000 unit) tab Take 1 Tab by mouth daily.  ascorbic acid, vitamin C, (Vitamin C) 500 mg tablet Take 500 mg by mouth daily.  ZINC PO Take 1 Tab by mouth daily.  albuterol (PROVENTIL VENTOLIN) 2.5 mg /3 mL (0.083 %) nebu by Nebulization route.  albuterol (PROVENTIL HFA, VENTOLIN HFA, PROAIR HFA) 90 mcg/actuation inhaler Take  by inhalation.  magnesium 250 mg tab Take  by mouth.     vit B Cmplx 3-FA-Vit C-Biotin (NEPHRO SERA RX) 1- mg-mg-mcg tablet Take 1 tablet by mouth daily.  vitamin E (AQUA GEMS) 400 unit capsule Take 1 capsule by mouth daily.  calcium-vitamin D (CALCIUM 600 + D,3,) 600 mg(1,500mg) -200 unit tab Take 1 tablet by mouth daily (with breakfast). No current facility-administered medications for this visit. Allergies   Allergen Reactions    Linagliptin Swelling     Swelling of tongue     Nickel Rash       Review of Systems: Per HPI    Physical Examination:   Blood pressure (!) 141/68, pulse 82, temperature 97.8 °F (36.6 °C), temperature source Temporal, resp. rate 18, height 5' 5\" (1.651 m), weight 230 lb 12.8 oz (104.7 kg), SpO2 99 %. Estimated body mass index is 38.41 kg/m² as calculated from the following:    Height as of this encounter: 5' 5\" (1.651 m). -   Weight as of this encounter: 230 lb 12.8 oz (104.7 kg). - General: pleasant, no distress, good eye contact  - HEENT: no pallor, no periorbital edema, EOMI  - Neck: supple,   - Cardiovascular: regular,  normal S1 and S2  - Respiratory: clear to auscultation bilaterally  - Edema since she was 13years old, no etiology  -   - Neurological: alert and oriented  - Psychiatric: normal mood and affect  - Skin: color, texture, turgor normal.     Diabetic foot exam: October 2018    Left Foot:   Visual Exam: normal    Pulse DP: 2+ (normal)   Filament test: normal sensation    Vibratory sensation: diminished      Right Foot:   Visual Exam: normal    Pulse DP: 2+ (normal)   Filament test: normal sensation    Vibratory sensation: diminished    Diabetic Foot and Eye Exam  Status   Topic Date Due    Eye Exam  03/21/2021    Diabetic Foot Care  10/15/2022       Data Reviewed:       Assessment/Plan:     No diagnosis found.     1. Type 2 Diabetes Mellitus with  neuropathy,retinopathy  Lab Results   Component Value Date/Time    Hemoglobin A1c 8.4 (H) 02/22/2022 07:39 AM    Hemoglobin A1c (POC) 8.5 10/15/2021 08:36 AM    Hemoglobin A1c, External 7.6 08/31/2018 12:00 AM Uncontrolled  Metformin, glipizide  Ozempic -did not like the side effects described and hence did not start the medication. Does not want SGLT2 inhibitors. She had angioedema with Tradjenta  Cannot use Actos due to lower extremity edema  She is a RN by profession,       2. HTN : Continue    3. Hyperlipidemia :  Declined statin    4. Obesity:Body mass index is 38.41 kg/m². Discussed about the importance of exercise and carbohydrate portion control. 5.  Sleep apnea: resolved per pt     6. Nodular goiter: Seen ENT  1.2 cm nodule left 3/19:     Chronic edema -since she was 13years old , no etiology found, information of Dr Annaamria Ba was given for further evaluation of venous insufficiency, could not see him        There are no Patient Instructions on file for this visit. Thank you for allowing me to participate in the care of this patient. Akil Luna MD      Patient verbalized understanding     Voice-recognition software was used to generate this report, which may result in some phonetic-based errors in the grammar and contents. Even though attempts were made to correct all the mistakes, some may have been missed and remained in the body of the report.

## 2022-03-18 PROBLEM — E66.01 OBESITY, MORBID (HCC): Status: ACTIVE | Noted: 2018-10-15

## 2022-03-18 PROBLEM — E78.2 MIXED HYPERLIPIDEMIA: Status: ACTIVE | Noted: 2020-07-27

## 2022-03-19 PROBLEM — R13.12 OROPHARYNGEAL DYSPHAGIA: Status: ACTIVE | Noted: 2019-10-28

## 2022-03-20 PROBLEM — I10 ESSENTIAL HYPERTENSION: Status: ACTIVE | Noted: 2019-10-28

## 2022-07-11 ENCOUNTER — OFFICE VISIT (OUTPATIENT)
Dept: ENDOCRINOLOGY | Age: 62
End: 2022-07-11
Payer: COMMERCIAL

## 2022-07-11 VITALS
WEIGHT: 235 LBS | HEART RATE: 71 BPM | TEMPERATURE: 97.4 F | BODY MASS INDEX: 39.15 KG/M2 | RESPIRATION RATE: 16 BRPM | SYSTOLIC BLOOD PRESSURE: 134 MMHG | HEIGHT: 65 IN | DIASTOLIC BLOOD PRESSURE: 68 MMHG | OXYGEN SATURATION: 99 %

## 2022-07-11 DIAGNOSIS — I10 ESSENTIAL HYPERTENSION: ICD-10-CM

## 2022-07-11 DIAGNOSIS — E78.2 MIXED HYPERLIPIDEMIA: ICD-10-CM

## 2022-07-11 DIAGNOSIS — E11.65 TYPE 2 DIABETES MELLITUS WITH HYPERGLYCEMIA, WITHOUT LONG-TERM CURRENT USE OF INSULIN (HCC): Primary | ICD-10-CM

## 2022-07-11 LAB
BUN SERPL-MCNC: 11 MG/DL (ref 8–27)
BUN/CREAT SERPL: 16 (ref 12–28)
CALCIUM SERPL-MCNC: 8.7 MG/DL (ref 8.7–10.3)
CHLORIDE SERPL-SCNC: 105 MMOL/L (ref 96–106)
CO2 SERPL-SCNC: 23 MMOL/L (ref 20–29)
CREAT SERPL-MCNC: 0.68 MG/DL (ref 0.57–1)
EGFR: 99 ML/MIN/1.73
EST. AVERAGE GLUCOSE BLD GHB EST-MCNC: 200 MG/DL
GLUCOSE SERPL-MCNC: 97 MG/DL (ref 65–99)
HBA1C MFR BLD: 8.6 % (ref 4.8–5.6)
POTASSIUM SERPL-SCNC: 4.1 MMOL/L (ref 3.5–5.2)
SODIUM SERPL-SCNC: 141 MMOL/L (ref 134–144)

## 2022-07-11 PROCEDURE — 99214 OFFICE O/P EST MOD 30 MIN: CPT | Performed by: INTERNAL MEDICINE

## 2022-07-11 PROCEDURE — 3052F HG A1C>EQUAL 8.0%<EQUAL 9.0%: CPT | Performed by: INTERNAL MEDICINE

## 2022-07-11 RX ORDER — METFORMIN HYDROCHLORIDE 500 MG/1
1000 TABLET, EXTENDED RELEASE ORAL 2 TIMES DAILY WITH MEALS
Qty: 360 TABLET | Refills: 3 | Status: SHIPPED | OUTPATIENT
Start: 2022-07-11

## 2022-07-11 RX ORDER — GLIPIZIDE 10 MG/1
TABLET ORAL
Qty: 180 TABLET | Refills: 3 | Status: SHIPPED | OUTPATIENT
Start: 2022-07-11

## 2022-07-11 RX ORDER — VALSARTAN AND HYDROCHLOROTHIAZIDE 160; 12.5 MG/1; MG/1
1 TABLET, FILM COATED ORAL DAILY
Qty: 90 TABLET | Refills: 3 | Status: SHIPPED | OUTPATIENT
Start: 2022-07-11

## 2022-07-11 NOTE — PROGRESS NOTES
Latricia Collins is a 64 y.o. female here for   Chief Complaint   Patient presents with    Diabetes       1. Have you been to the ER, urgent care clinic since your last visit? Hospitalized since your last visit? -no    2. Have you seen or consulted any other health care providers outside of the [address] [place] [address] since your last visit?   Include any pap smears or colon screening.-no

## 2022-07-11 NOTE — LETTER
7/11/2022    Patient: Mahesh Devi   YOB: 1960   Date of Visit: 7/11/2022     [phone]  Via Fax: [phone]    Dear Stacy Montiel MD,      Thank you for referring Ms. Carrie Dominguez to [facility] for evaluation. My notes for this consultation are attached. If you have questions, please do not hesitate to call me. I look forward to following your patient along with you.       Sincerely,    Landon Franks MD

## 2022-07-11 NOTE — PROGRESS NOTES
Fartun Carreno MD          Patient Information  Date:7/11/2022  Name : Alyson Noel 64 y.o.     YOB: 1960         Referred by: Checo Vicente MD         Chief Complaint   Patient presents with    Diabetes       History of Present Illness: Alyson Noel is a 64 y.o. female here for follow-up of  Type 2 Diabetes Mellitus. Type 2 Diabetes was diagnosed 1998 . End organ effects of diabetes: retinopathy. Cardiovascular risk factors: family history, dyslipidemia   Given the expense of brand medications she is on glipizide, metformin  Checking blood glucose mostly fasting, ranging from , very rarely 150    No hypoglycemia    Chronic lower extremity edema      Wt Readings from Last 3 Encounters:   07/11/22 235 lb (106.6 kg)   02/24/22 230 lb 12.8 oz (104.7 kg)   10/15/21 239 lb (108.4 kg)       BP Readings from Last 3 Encounters:   07/11/22 134/68   02/24/22 (!) 141/68   10/15/21 136/70           Past Medical History:   Diagnosis Date    Hyperlipidemia     Hypertension     Menopause     Type 2 diabetes mellitus (HCC)      Current Outpatient Medications   Medication Sig    glipiZIDE (GLUCOTROL) 10 mg tablet TAKE 1 TABLET BY MOUTH TWO (2) TIMES A DAY. STOP GLYBURIDE    metFORMIN ER (GLUCOPHAGE XR) 500 mg tablet Take 2 Tablets by mouth two (2) times daily (with meals).  valsartan-hydroCHLOROthiazide (DIOVAN-HCT) 160-12.5 mg per tablet Take 1 Tablet by mouth daily.  cholecalciferol, vitamin D3, (Vitamin D3) 50 mcg (2,000 unit) tab Take 1 Tab by mouth daily.  ascorbic acid, vitamin C, (Vitamin C) 500 mg tablet Take 500 mg by mouth daily.  albuterol (PROVENTIL VENTOLIN) 2.5 mg /3 mL (0.083 %) nebu 2.5 mg by Nebulization route every four (4) hours as needed.  albuterol (PROVENTIL HFA, VENTOLIN HFA, PROAIR HFA) 90 mcg/actuation inhaler Take 2 Puffs by inhalation every four (4) hours as needed.     magnesium 250 mg tab Take  by mouth.  vit B Cmplx 3-FA-Vit C-Biotin (NEPHRO SERA RX) 1- mg-mg-mcg tablet Take 1 tablet by mouth daily.  vitamin E (AQUA GEMS) 400 unit capsule Take 1 capsule by mouth daily.  calcium-vitamin D (CALCIUM 600 + D,3,) 600 mg(1,500mg) -200 unit tab Take 1 tablet by mouth daily (with breakfast). No current facility-administered medications for this visit. Allergies   Allergen Reactions    Linagliptin Swelling     Swelling of tongue     Nickel Rash       Review of Systems: Per HPI    Physical Examination:   Blood pressure 134/68, pulse 71, temperature 97.4 °F (36.3 °C), temperature source Temporal, resp. rate 16, height 5' 5\" (1.651 m), weight 235 lb (106.6 kg), SpO2 99 %. Estimated body mass index is 39.11 kg/m² as calculated from the following:    Height as of this encounter: 5' 5\" (1.651 m). -   Weight as of this encounter: 235 lb (106.6 kg). - General: pleasant, no distress, good eye contact  - HEENT: no pallor, no periorbital edema, EOMI  - Neck: supple,   - Cardiovascular: regular,  normal S1 and S2  - Respiratory: clear to auscultation bilaterally  - Edema since she was 13years old, no etiology  -   - Neurological: alert and oriented  - Psychiatric: normal mood and affect  - Skin: color, texture, turgor normal.     Diabetic foot exam: October 2018    Left Foot:   Visual Exam: normal    Pulse DP: 2+ (normal)   Filament test: normal sensation    Vibratory sensation: diminished      Right Foot:   Visual Exam: normal    Pulse DP: 2+ (normal)   Filament test: normal sensation    Vibratory sensation: diminished    Diabetic Foot and Eye Exam  Status   Topic Date Due    Eye Exam  03/21/2021    Diabetic Foot Care  07/11/2023       Data Reviewed:       Assessment/Plan:     1. Type 2 diabetes mellitus with hyperglycemia, without long-term current use of insulin ([address]    2. Essential hypertension    3. Mixed hyperlipidemia        1.  Type 2 Diabetes Mellitus with neuropathy,retinopathy  Lab Results   Component Value Date/Time    Hemoglobin A1c 8.6 (H) 07/09/2022 09:44 AM    Hemoglobin A1c (POC) 8.5 10/15/2021 08:36 AM    Hemoglobin A1c, External 7.6 08/31/2018 12:00 AM     Uncontrolled  Metformin, glipizide  Ozempic -did not like the side effects described and hence did not start the medication. Does not want SGLT2 inhibitors. She had angioedema with Tradjenta  Cannot use Actos due to lower extremity edema  Declined insulin now  Discussed complications of uncontrolled diabetes mellitus  To check the blood glucose at bedtime, fastings are well controlled, she will call back with her decision about the medications  She is a RN by profession,   Eye exam annually     2. HTN : Continue    3. Hyperlipidemia :  Declined statin    4. Obesity:Body mass index is 39.11 kg/m². Discussed about the importance of exercise and carbohydrate portion control. 5.  Sleep apnea: resolved per pt     6. Nodular goiter: Seen ENT  1.2 cm nodule left 3/19:     Chronic edema -since she was 13years old , no etiology found, information of Dr Deni Wheeler was given for further evaluation of venous insufficiency, could not see him        There are no Patient Instructions on file for this visit. Follow-up and Dispositions    · Return in about 5 months (around 12/11/2022). Thank you for allowing me to participate in the care of this patient. Rosa Elena Davis MD      Patient verbalized understanding     Voice-recognition software was used to generate this report, which may result in some phonetic-based errors in the grammar and contents. Even though attempts were made to correct all the mistakes, some may have been missed and remained in the body of the report.

## 2022-11-29 ENCOUNTER — TRANSCRIBE ORDER (OUTPATIENT)
Dept: EMERGENCY DEPT | Age: 62
End: 2022-11-29

## 2022-11-29 DIAGNOSIS — Z12.31 BREAST CANCER SCREENING BY MAMMOGRAM: Primary | ICD-10-CM

## 2023-03-06 LAB
BUN SERPL-MCNC: 13 MG/DL (ref 8–27)
BUN/CREAT SERPL: 17 (ref 12–28)
CALCIUM SERPL-MCNC: 9.4 MG/DL (ref 8.7–10.3)
CHLORIDE SERPL-SCNC: 107 MMOL/L (ref 96–106)
CO2 SERPL-SCNC: 24 MMOL/L (ref 20–29)
CREAT SERPL-MCNC: 0.78 MG/DL (ref 0.57–1)
EGFRCR SERPLBLD CKD-EPI 2021: 86 ML/MIN/1.73
EST. AVERAGE GLUCOSE BLD GHB EST-MCNC: 200 MG/DL
GLUCOSE SERPL-MCNC: 70 MG/DL (ref 70–99)
HBA1C MFR BLD: 8.6 % (ref 4.8–5.6)
POTASSIUM SERPL-SCNC: 4.5 MMOL/L (ref 3.5–5.2)
SODIUM SERPL-SCNC: 145 MMOL/L (ref 134–144)

## 2023-03-08 ENCOUNTER — OFFICE VISIT (OUTPATIENT)
Dept: ENDOCRINOLOGY | Age: 63
End: 2023-03-08
Payer: COMMERCIAL

## 2023-03-08 VITALS
RESPIRATION RATE: 18 BRPM | HEART RATE: 79 BPM | OXYGEN SATURATION: 97 % | HEIGHT: 65 IN | DIASTOLIC BLOOD PRESSURE: 70 MMHG | BODY MASS INDEX: 39.65 KG/M2 | WEIGHT: 238 LBS | TEMPERATURE: 97.7 F | SYSTOLIC BLOOD PRESSURE: 141 MMHG

## 2023-03-08 DIAGNOSIS — E11.65 TYPE 2 DIABETES MELLITUS WITH HYPERGLYCEMIA, WITHOUT LONG-TERM CURRENT USE OF INSULIN (HCC): ICD-10-CM

## 2023-03-08 DIAGNOSIS — E78.2 MIXED HYPERLIPIDEMIA: Primary | ICD-10-CM

## 2023-03-08 DIAGNOSIS — I10 ESSENTIAL HYPERTENSION: ICD-10-CM

## 2023-03-08 PROCEDURE — 3077F SYST BP >= 140 MM HG: CPT | Performed by: INTERNAL MEDICINE

## 2023-03-08 PROCEDURE — 99214 OFFICE O/P EST MOD 30 MIN: CPT | Performed by: INTERNAL MEDICINE

## 2023-03-08 PROCEDURE — 3052F HG A1C>EQUAL 8.0%<EQUAL 9.0%: CPT | Performed by: INTERNAL MEDICINE

## 2023-03-08 PROCEDURE — 3078F DIAST BP <80 MM HG: CPT | Performed by: INTERNAL MEDICINE

## 2023-03-08 RX ORDER — VALSARTAN AND HYDROCHLOROTHIAZIDE 160; 12.5 MG/1; MG/1
1 TABLET, FILM COATED ORAL DAILY
Qty: 90 TABLET | Refills: 3 | Status: SHIPPED | OUTPATIENT
Start: 2023-03-08

## 2023-03-08 RX ORDER — METFORMIN HYDROCHLORIDE 500 MG/1
1000 TABLET, EXTENDED RELEASE ORAL 2 TIMES DAILY WITH MEALS
Qty: 360 TABLET | Refills: 3 | Status: SHIPPED | OUTPATIENT
Start: 2023-03-08

## 2023-03-08 RX ORDER — GLIPIZIDE 10 MG/1
TABLET ORAL
Qty: 180 TABLET | Refills: 3 | Status: SHIPPED | OUTPATIENT
Start: 2023-03-08

## 2023-03-08 NOTE — PROGRESS NOTES
Alla Chapman is a 58 y.o. female here for   Chief Complaint   Patient presents with    Diabetes       1. Have you been to the ER or an urgent care clinic since your last visit?  -  [facility] - severe left ankle pain accompanied by swelling     2. Have you been hospitalized since your last visit? - no    3. Have you seen or consulted any other health care providers outside of the [address] [place] [address] since your last visit?   Include any pap smears or colon screening.- no

## 2023-03-08 NOTE — LETTER
3/8/2023    Patient: Reese Blas   YOB: 1960   Date of Visit: 3/8/2023     [phone]  Via Fax: [phone]    Dear Lidia Young MD,      Thank you for referring Ms. Maryanne Trevino to [facility] for evaluation. My notes for this consultation are attached. If you have questions, please do not hesitate to call me. I look forward to following your patient along with you.       Sincerely,    Chata Higuera MD

## 2023-03-08 NOTE — PROGRESS NOTES
Nick Prasad MD          Patient Information  Date:3/8/2023  Name : Denisa Madrid 58 y.o.     YOB: 1960         Referred by: Sharlene Lee MD         Chief Complaint   Patient presents with    Diabetes       History of Present Illness: Denisa Madrdi is a 58 y.o. female here for follow-up of  Type 2 Diabetes Mellitus. 3/8/23  AM BG good   No other blood glucose readings  Occasional difficulty swallowing  No choking sensation  Lower extremity edema, intermittent chest pain, no sweating, no radiation of the pain    Prior history  Type 2 Diabetes was diagnosed 1998 . End organ effects of diabetes: retinopathy. Cardiovascular risk factors: family history, dyslipidemia   Given the expense of brand medications she is on glipizide, metformin  Checking blood glucose mostly fasting, ranging from , very rarely 150    No hypoglycemia    Chronic lower extremity edema      Wt Readings from Last 3 Encounters:   03/08/23 238 lb (108 kg)   07/11/22 235 lb (106.6 kg)   02/24/22 230 lb 12.8 oz (104.7 kg)       BP Readings from Last 3 Encounters:   03/08/23 (!) 141/70   07/11/22 134/68   02/24/22 (!) 141/68       Past Medical History:   Diagnosis Date    Hyperlipidemia     Hypertension     Menopause     Type 2 diabetes mellitus ([facility] [address]      Current Outpatient Medications   Medication Sig    glipiZIDE (GLUCOTROL) 10 mg tablet TAKE 1 TABLET BY MOUTH TWO (2) TIMES A DAY. STOP GLYBURIDE    metFORMIN ER (GLUCOPHAGE XR) 500 mg tablet Take 2 Tablets by mouth two (2) times daily (with meals). valsartan-hydroCHLOROthiazide (DIOVAN-HCT) 160-12.5 mg per tablet Take 1 Tablet by mouth daily. cholecalciferol, vitamin D3, 50 mcg (2,000 unit) tab Take 1 Tab by mouth daily. ascorbic acid, vitamin C, (VITAMIN C) 500 mg tablet Take 500 mg by mouth daily.     albuterol (PROVENTIL VENTOLIN) 2.5 mg /3 mL (0.083 %) nebu 2.5 mg by Nebulization route every four (4) hours as needed. albuterol (PROVENTIL HFA, VENTOLIN HFA, PROAIR HFA) 90 mcg/actuation inhaler Take 2 Puffs by inhalation every four (4) hours as needed. magnesium 250 mg tab Take  by mouth. vit B Cmplx 3-FA-Vit C-Biotin (NEPHRO SERA RX) 1- mg-mg-mcg tablet Take 1 tablet by mouth daily. vitamin E (AQUA GEMS) 400 unit capsule Take 1 capsule by mouth daily. calcium-vitamin D 600 mg-5 mcg (200 unit) tab Take 1 tablet by mouth daily (with breakfast). No current facility-administered medications for this visit. Allergies   Allergen Reactions    Linagliptin Swelling     Swelling of tongue     Nickel Rash       Review of Systems: Per HPI    Physical Examination:   Blood pressure (!) 141/70, pulse 79, temperature 97.7 °F (36.5 °C), temperature source Temporal, resp. rate 18, height 5' 5\" (1.651 m), weight 238 lb (108 kg), SpO2 97 %. Estimated body mass index is 39.61 kg/m² as calculated from the following:    Height as of this encounter: 5' 5\" (1.651 m). Weight as of this encounter: 238 lb (108 kg). General: pleasant, no distress, good eye contact  HEENT: no pallor, no periorbital edema, EOMI  Neck: supple,   Cardiovascular: regular,  normal S1 and S2  Respiratory: clear to auscultation bilaterally  Edema since she was 13years old, no etiology    Neurological: alert and oriented  Psychiatric: normal mood and affect  Skin: color, texture, turgor normal.     Diabetic foot exam: October 2018    Left Foot:   Visual Exam: normal    Pulse DP: 2+ (normal)   Filament test: normal sensation    Vibratory sensation: diminished      Right Foot:   Visual Exam: normal    Pulse DP: 2+ (normal)   Filament test: normal sensation    Vibratory sensation: diminished    Diabetic Foot and Eye Exam  Status   Topic Date Due    Eye Exam  03/21/2021    Diabetic Foot Care  07/11/2023       Data Reviewed:       Assessment/Plan:     1.  Type 2 diabetes mellitus with hyperglycemia, without long-term current use of insulin ([facility] [address]    2. Essential hypertension        1. Type 2 Diabetes Mellitus with  neuropathy,retinopathy  Lab Results   Component Value Date/Time    Hemoglobin A1c 8.6 (H) 03/04/2023 11:26 AM    Hemoglobin A1c (POC) 8.5 10/15/2021 08:36 AM    Hemoglobin A1c, External 7.6 08/31/2018 12:00 AM     Uncontrolled  Metformin, glipizide  Ozempic -did not like the side effects described and hence did not start the medication. Does not want SGLT2 inhibitors. She had angioedema with Tradjenta  Cannot use Actos due to lower extremity edema  Declined insulin now  Discussed complications of uncontrolled diabetes mellitus  To check the blood glucose at bedtime, fastings are well controlled, she will call back with her decision about the medications, declined additional medications now  She is a RN by profession,   Eye exam annually     2. HTN : Continue    3. Hyperlipidemia :  Declined statin    4. Obesity:Body mass index is 39.61 kg/m². Discussed about the importance of exercise and carbohydrate portion control. 5.  Sleep apnea: resolved per pt     6. Nodular goiter: Seen ENT  1.2 cm nodule left 3/19:     Chronic edema -since she was 13years old , no etiology found, information of Dr Nitza Lal was given for further evaluation of venous insufficiency, could not see him      Chest discomfort: Given comorbidities discussed it could be either related to the heart or could be a GI issue. Need work-up for cardiac issues, to follow-up with PCP soon. ER if it gets worse      There are no Patient Instructions on file for this visit. Thank you for allowing me to participate in the care of this patient. Roc Hagen MD      Patient verbalized understanding     Voice-recognition software was used to generate this report, which may result in some phonetic-based errors in the grammar and contents. Even though attempts were made to correct all the mistakes, some may have been missed and remained in the body of the report.

## 2023-03-30 ENCOUNTER — OFFICE VISIT (OUTPATIENT)
Dept: URGENT CARE | Age: 63
End: 2023-03-30

## 2023-03-30 ENCOUNTER — APPOINTMENT (OUTPATIENT)
Dept: ULTRASOUND IMAGING | Age: 63
End: 2023-03-30
Attending: EMERGENCY MEDICINE
Payer: COMMERCIAL

## 2023-03-30 ENCOUNTER — HOSPITAL ENCOUNTER (EMERGENCY)
Age: 63
Discharge: HOME | End: 2023-03-30
Attending: EMERGENCY MEDICINE
Payer: COMMERCIAL

## 2023-03-30 VITALS
TEMPERATURE: 98.6 F | DIASTOLIC BLOOD PRESSURE: 84 MMHG | HEART RATE: 84 BPM | BODY MASS INDEX: 39.65 KG/M2 | SYSTOLIC BLOOD PRESSURE: 137 MMHG | WEIGHT: 238 LBS | RESPIRATION RATE: 18 BRPM | OXYGEN SATURATION: 97 % | HEIGHT: 65 IN

## 2023-03-30 VITALS
DIASTOLIC BLOOD PRESSURE: 62 MMHG | WEIGHT: 238 LBS | TEMPERATURE: 98.6 F | BODY MASS INDEX: 39.65 KG/M2 | HEIGHT: 65 IN | SYSTOLIC BLOOD PRESSURE: 158 MMHG | RESPIRATION RATE: 18 BRPM | OXYGEN SATURATION: 98 % | HEART RATE: 80 BPM

## 2023-03-30 DIAGNOSIS — R09.89 SUSPECTED DVT (DEEP VEIN THROMBOSIS): Primary | ICD-10-CM

## 2023-03-30 DIAGNOSIS — M79.89 LEFT LEG SWELLING: Primary | ICD-10-CM

## 2023-03-30 PROCEDURE — 99284 EMERGENCY DEPT VISIT MOD MDM: CPT

## 2023-03-30 PROCEDURE — 93971 EXTREMITY STUDY: CPT

## 2023-03-30 RX ORDER — NAPROXEN 500 MG/1
500 TABLET ORAL 2 TIMES DAILY WITH MEALS
Qty: 20 TABLET | Refills: 0 | Status: SHIPPED | OUTPATIENT
Start: 2023-03-30

## 2023-03-30 NOTE — ED NOTES
Verbal shift change report given to Camelia Agosto RN (oncoming nurse) by Avila Jeter RN (offgoing nurse). Report included the following information SBAR and ED Summary.

## 2023-03-30 NOTE — ED TRIAGE NOTES
Pt as had swelling in left foot since January but the swelling is going up her leg now and she is having a lot of pain with it.

## 2023-03-30 NOTE — ED PROVIDER NOTES
60-year-old female with a past medical history significant for diabetes, morbid obesity, asthma, hyperlipidemia, hypertension, menopause who presents to the ER with a complaint of intermittent episodes of left leg s and ankle swelling for the last 2 weeks. The patient was seen in urgent care and sent to the ER for further evaluation. Patient denies any recent history of trauma or fall, fever and chills, headache, neck and back pain, chest pain shortness of breath, vomiting, diarrhea, constipation, dysuria, dizziness, extremity weakness or numbness, sick contact, skin rash or recent travel, prior history of the same.        Past Medical History:   Diagnosis Date    Asthma     Hyperlipidemia     Hypertension     Menopause     Type 2 diabetes mellitus ([facility] [address]        Past Surgical History:   Procedure Laterality Date    HX BREAST BIOPSY Right 2017    benign    HX  SECTION  21,52,40    X3    HX CHOLECYSTECTOMY      lap    HX HERNIA REPAIR           Family History:   Problem Relation Age of Onset    Diabetes Mother     Hypertension Mother     OSTEOARTHRITIS Mother     Arthritis-rheumatoid Sister     Diabetes Father     Prostate Cancer Father     Diabetes Maternal Grandmother     Hypertension Maternal Grandmother     Stroke Maternal Grandmother     Diabetes Maternal Grandfather     Hypertension Maternal Grandfather     Cancer Maternal Grandfather     Diabetes Paternal Grandmother     Hypertension Paternal Grandmother     Diabetes Paternal Grandfather     Hypertension Paternal Grandfather     Diabetes Sister     OSTEOARTHRITIS Sister     Heart Failure Sister     Heart Attack Sister        Social History     Socioeconomic History    Marital status: SINGLE     Spouse name: Not on file    Number of children: Not on file    Years of education: Not on file    Highest education level: Not on file   Occupational History    Not on file   Tobacco Use    Smoking status: Never    Smokeless tobacco: Never   Substance and Sexual Activity    Alcohol use: No     Alcohol/week: 0.0 standard drinks    Drug use: No    Sexual activity: Not on file   Other Topics Concern    Not on file   Social History Narrative    Not on file     Social Determinants of Health     Financial Resource Strain: Not on file   Food Insecurity: Not on file   Transportation Needs: Not on file   Physical Activity: Not on file   Stress: Not on file   Social Connections: Not on file   Intimate Partner Violence: Not on file   Housing Stability: Not on file         ALLERGIES: Linagliptin and Nickel    Review of Systems   All other systems reviewed and are negative. Vitals:    03/30/23 1846   BP: (!) 158/62   Pulse: 80   Resp: 18   Temp: 98.6 °F (37 °C)   SpO2: 98%   Weight: 108 kg (238 lb)   Height: 5' 5\" (1.651 m)            Physical Exam  Vitals and nursing note reviewed. Exam conducted with a chaperone present. CONSTITUTIONAL: Well-appearing; well-nourished; in no apparent distress  HEAD: Normocephalic; atraumatic  EYES: PERRL; EOM intact; conjunctiva and sclera are clear bilaterally. ENT: No rhinorrhea; normal pharynx with no tonsillar hypertrophy; mucous membranes pink/moist, no erythema, no exudate. NECK: Supple; non-tender; no cervical lymphadenopathy  CARD: Normal S1, S2; no murmurs, rubs, or gallops. Regular rate and rhythm. RESP: Normal respiratory effort; breath sounds clear and equal bilaterally; no wheezes, rhonchi, or rales. ABD: Normal bowel sounds; non-distended; non-tender; no palpable organomegaly, no masses, no bruits. Back Exam: Normal inspection; no vertebral point tenderness, no CVA tenderness. Normal range of motion. EXT: Normal ROM in all four extremities; non-tender to palpation; no swelling or deformity; distal pulses are normal, mild swelling of the left ankle with nonpitting edema but no calf tenderness. SKIN: Warm; dry; no rash.   NEURO:Alert and oriented x 3, coherent, TARUN-XII grossly intact, sensory and motor are non-focal.        Medical Decision Making  Assessment: 58 acute left leg swelling-year-old female who presents to the ER for evaluation for left ankle and leg swelling suspect inflammatory arthropathy rule out DVT versus lymphedema. The patient remained hemodynamically stable and ambulate without difficulty. She has no other constitutional symptoms at this time. Plan: Duplex study of the left leg/education, reassurance, symptomatic treatment/serial exam/ Monitor and Reevaluate. Amount and/or Complexity of Data Reviewed  ECG/medicine tests: ordered. Risk  Prescription drug management. Procedures      Progress Note:   Pt has been reexamined by Latonia Frost MD. Pt is feeling much better. Symptoms have improved. All available results have been reviewed with pt and any available family. Pt understands sx, dx, and tx in ED. Care plan has been outlined and questions have been answered. Pt is ready to go home. Will send home on acute left leg swelling instruction. Prescription of naproxen. Outpatient referral with PCP as needed. Written by Latonia Frost MD,7:59 PM    .   .

## 2023-03-30 NOTE — Clinical Note
[facility]  [address]  [address]  [phone]    Work/School Note    Date: 3/30/2023    To Whom It May concern:    Kelly Woodward was seen and treated today in the emergency room by the following provider(s):  Attending Provider: Regina Garibay MD.      Kelly Woodward is excused from work/school on 03/30/23 and 03/31/23. She is medically clear to return to work/school on 4/1/2023.        Sincerely,          Parul Bartholomew MD

## 2023-03-30 NOTE — PATIENT INSTRUCTIONS
Go to one of the following below immediately without delay:    [address]   ([address])  [place]   [phone]   Open 24 hours    [facility]   ([address])  [address] [address] [place]   [phone] 24 hours    [facility] [address]  [address])  [address], [address] [facility]   [address] [place] [address]  [place] [address] [facility]   [phone]

## 2023-03-30 NOTE — PROGRESS NOTES
Here for left lower extremity swelling. Has chronic LE edema R and L without particular diagnosis  Promotes left leg has become much more swollen over the past week   Now with calf pain and tenderness. No new injury. Worse with walking or standing. No fever, chills, SOB, chest pain  No PMH of DVT. Non smoker. No recent surgeries or travel.            Past Medical History:   Diagnosis Date    Hyperlipidemia     Hypertension     Menopause     Type 2 diabetes mellitus ([facility] [address]         Past Surgical History:   Procedure Laterality Date    HX BREAST BIOPSY Right 2017    benign    HX  SECTION  94,69,10    X3    HX CHOLECYSTECTOMY  2002    lap    HX HERNIA REPAIR           Family History   Problem Relation Age of Onset    Diabetes Mother     Hypertension Mother     OSTEOARTHRITIS Mother     Arthritis-rheumatoid Sister     Diabetes Father     Prostate Cancer Father     Diabetes Maternal Grandmother     Hypertension Maternal Grandmother     Stroke Maternal Grandmother     Diabetes Maternal Grandfather     Hypertension Maternal Grandfather     Cancer Maternal Grandfather     Diabetes Paternal Grandmother     Hypertension Paternal Grandmother     Diabetes Paternal Grandfather     Hypertension Paternal Grandfather     Diabetes Sister     OSTEOARTHRITIS Sister     Heart Failure Sister     Heart Attack Sister         Social History     Socioeconomic History    Marital status: SINGLE     Spouse name: Not on file    Number of children: Not on file    Years of education: Not on file    Highest education level: Not on file   Occupational History    Not on file   Tobacco Use    Smoking status: Never    Smokeless tobacco: Never   Substance and Sexual Activity    Alcohol use: No     Alcohol/week: 0.0 standard drinks    Drug use: No    Sexual activity: Not on file   Other Topics Concern    Not on file   Social History Narrative    Not on file     Social Determinants of Health     Financial Resource Strain: Not on file   Food Insecurity: Not on file   Transportation Needs: Not on file   Physical Activity: Not on file   Stress: Not on file   Social Connections: Not on file   Intimate Partner Violence: Not on file   Housing Stability: Not on file                ALLERGIES: Linagliptin and Nickel    Review of Systems   All other systems reviewed and are negative. Vitals:    03/30/23 1730   BP: 137/84   Pulse: 84   Resp: 18   Temp: 98.6 °F (37 °C)   SpO2: 97%   Weight: 238 lb (108 kg)   Height: 5' 5\" (1.651 m)       Physical Exam  Constitutional:       Appearance: She is not ill-appearing. HENT:      Head: Normocephalic and atraumatic. Eyes:      Extraocular Movements: Extraocular movements intact. Cardiovascular:      Rate and Rhythm: Normal rate and regular rhythm. Pulses: Normal pulses. Heart sounds: Normal heart sounds. No murmur heard. No friction rub. No gallop. Pulmonary:      Effort: Pulmonary effort is normal. No respiratory distress. Breath sounds: Normal breath sounds. Musculoskeletal:        Legs:    Skin:     General: Skin is warm and dry. Capillary Refill: Capillary refill takes less than 2 seconds. Findings: No rash. Neurological:      Mental Status: She is alert and oriented to person, place, and time. Psychiatric:         Mood and Affect: Mood normal.         Behavior: Behavior normal.       MDM     Differential Diagnosis; Clinical Impression; Plan:       CLINICAL IMPRESSION:  (R09.89) Suspected DVT (deep vein thrombosis)  (primary encounter diagnosis)    Plan:  RLE edema TTP posterior calf. Advised to go to ED r/o DVT.        Procedures

## 2023-03-30 NOTE — Clinical Note
[facility] EMERGENCY [facility]  [address]  [address]  [phone]    Work/School Note    Date: 3/30/2023    To Whom It May concern:    Faye Tejeda was seen and treated today in the emergency room by the following provider(s):  Attending Provider: Brian Craig MD.      Faye Tejeda is excused from work/school on 03/30/23 and 03/31/23. She is medically clear to return to work/school on 4/1/2023.        Sincerely,          Opal Mar MD

## 2023-04-22 DIAGNOSIS — E11.65 TYPE 2 DIABETES MELLITUS WITH HYPERGLYCEMIA, WITHOUT LONG-TERM CURRENT USE OF INSULIN (HCC): Primary | ICD-10-CM

## 2023-04-24 DIAGNOSIS — E11.65 TYPE 2 DIABETES MELLITUS WITH HYPERGLYCEMIA, WITHOUT LONG-TERM CURRENT USE OF INSULIN (HCC): Primary | ICD-10-CM

## 2023-05-10 DIAGNOSIS — E11.65 TYPE 2 DIABETES MELLITUS WITH HYPERGLYCEMIA, WITHOUT LONG-TERM CURRENT USE OF INSULIN (HCC): Primary | ICD-10-CM

## 2023-08-27 LAB
ALBUMIN/CREAT UR: 4 MG/G CREAT (ref 0–29)
BUN SERPL-MCNC: 10 MG/DL (ref 8–27)
BUN/CREAT SERPL: 13 (ref 12–28)
CALCIUM SERPL-MCNC: 8.8 MG/DL (ref 8.7–10.3)
CHLORIDE SERPL-SCNC: 104 MMOL/L (ref 96–106)
CO2 SERPL-SCNC: 24 MMOL/L (ref 20–29)
CREAT SERPL-MCNC: 0.77 MG/DL (ref 0.57–1)
CREAT UR-MCNC: 172.1 MG/DL
EGFRCR SERPLBLD CKD-EPI 2021: 87 ML/MIN/1.73
GLUCOSE SERPL-MCNC: 151 MG/DL (ref 70–99)
LDLC SERPL DIRECT ASSAY-MCNC: 86 MG/DL (ref 0–99)
MICROALBUMIN UR-MCNC: 6.4 UG/ML
POTASSIUM SERPL-SCNC: 4.1 MMOL/L (ref 3.5–5.2)
SODIUM SERPL-SCNC: 141 MMOL/L (ref 134–144)

## 2023-08-28 LAB
EST. AVERAGE GLUCOSE BLD GHB EST-MCNC: 214 MG/DL
HBA1C MFR BLD: 9.1 % (ref 4.8–5.6)

## 2023-08-29 ENCOUNTER — OFFICE VISIT (OUTPATIENT)
Age: 63
End: 2023-08-29
Payer: COMMERCIAL

## 2023-08-29 VITALS
BODY MASS INDEX: 40.32 KG/M2 | HEIGHT: 65 IN | HEART RATE: 81 BPM | TEMPERATURE: 97.3 F | SYSTOLIC BLOOD PRESSURE: 131 MMHG | WEIGHT: 242 LBS | RESPIRATION RATE: 20 BRPM | DIASTOLIC BLOOD PRESSURE: 61 MMHG | OXYGEN SATURATION: 97 %

## 2023-08-29 DIAGNOSIS — E78.2 MIXED HYPERLIPIDEMIA: ICD-10-CM

## 2023-08-29 DIAGNOSIS — E04.9 NODULAR GOITER: ICD-10-CM

## 2023-08-29 DIAGNOSIS — E11.65 TYPE 2 DIABETES MELLITUS WITH HYPERGLYCEMIA, WITHOUT LONG-TERM CURRENT USE OF INSULIN (HCC): Primary | ICD-10-CM

## 2023-08-29 DIAGNOSIS — I10 ESSENTIAL HYPERTENSION: ICD-10-CM

## 2023-08-29 PROCEDURE — 3078F DIAST BP <80 MM HG: CPT | Performed by: INTERNAL MEDICINE

## 2023-08-29 PROCEDURE — 3046F HEMOGLOBIN A1C LEVEL >9.0%: CPT | Performed by: INTERNAL MEDICINE

## 2023-08-29 PROCEDURE — 3075F SYST BP GE 130 - 139MM HG: CPT | Performed by: INTERNAL MEDICINE

## 2023-08-29 PROCEDURE — 99214 OFFICE O/P EST MOD 30 MIN: CPT | Performed by: INTERNAL MEDICINE

## 2023-08-29 RX ORDER — SEMAGLUTIDE 0.68 MG/ML
INJECTION, SOLUTION SUBCUTANEOUS
Qty: 9 ML | Refills: 3 | Status: SHIPPED | OUTPATIENT
Start: 2023-08-29

## 2023-08-29 RX ORDER — CALCIUM CARBONATE 300MG(750)
1 TABLET,CHEWABLE ORAL DAILY
COMMUNITY

## 2023-08-29 RX ORDER — VITAMIN B COMPLEX
1 CAPSULE ORAL DAILY
COMMUNITY

## 2023-08-29 NOTE — PATIENT INSTRUCTIONS
OZEMPIC     Inject 0.25 mg weekly for one month, then increase to 0.5 mg weekly. Some of the side effects are nausea, vomiting and in most cases it will eventually resolve. If there is a family history of medullary thyroid cancer you should not be taking this medication.     Once you tolerate 0.5 mg weekly we can increase it to 1 mg weekly if desired weight loss or sugar control is not achieved, you need a different prescription for 1 mg weekly dose

## 2023-08-30 LAB — LDLC SERPL DIRECT ASSAY-MCNC: 90 MG/DL (ref 0–99)

## 2023-09-07 ENCOUNTER — HOSPITAL ENCOUNTER (OUTPATIENT)
Facility: HOSPITAL | Age: 63
Discharge: HOME | End: 2023-09-07
Attending: INTERNAL MEDICINE
Payer: COMMERCIAL

## 2023-09-07 DIAGNOSIS — E11.65 TYPE 2 DIABETES MELLITUS WITH HYPERGLYCEMIA, WITHOUT LONG-TERM CURRENT USE OF INSULIN (HCC): ICD-10-CM

## 2023-09-07 PROCEDURE — 76536 US EXAM OF HEAD AND NECK: CPT

## 2023-09-15 ENCOUNTER — TELEPHONE (OUTPATIENT)
Age: 63
End: 2023-09-15

## 2023-09-15 NOTE — TELEPHONE ENCOUNTER
Informed pt of Dr. Ledy Hendrix note. Pt verbalized understanding with no further questions or concerns at this time. Transferred pt to  to schedule.

## 2023-09-15 NOTE — TELEPHONE ENCOUNTER
----- Message from Kacey Reyes MD sent at 9/13/2023  3:41 PM EDT -----  Need left thyroid nodule biopsy  We will discuss in detail at the visit

## 2023-10-03 ENCOUNTER — OFFICE VISIT (OUTPATIENT)
Age: 63
End: 2023-10-03

## 2023-10-03 VITALS
HEART RATE: 88 BPM | RESPIRATION RATE: 18 BRPM | TEMPERATURE: 97.8 F | HEIGHT: 65 IN | BODY MASS INDEX: 39.89 KG/M2 | DIASTOLIC BLOOD PRESSURE: 67 MMHG | OXYGEN SATURATION: 98 % | WEIGHT: 239.4 LBS | SYSTOLIC BLOOD PRESSURE: 144 MMHG

## 2023-10-03 DIAGNOSIS — E04.9 NODULAR GOITER: Primary | ICD-10-CM

## 2023-10-03 NOTE — PROGRESS NOTES
Chen Rodriguez is a 61 y.o. female here for   Chief Complaint   Patient presents with    Biopsy       1. Have you been to the ER, urgent care clinic since your last visit? Hospitalized since your last visit? - no    2. Have you seen or consulted any other health care providers outside of the [address] [place] [address] since your last visit?   Include any pap smears or colon screening.- no

## 2023-10-03 NOTE — PROGRESS NOTES
[unfilled]  OFFICE PROCEDURE PROGRESS NOTE        Chart reviewed for the following:   Noa Bronson MD, have reviewed the History, Physical and updated the Allergic reactions for Maxwell Hair     Procedure performed by:  Ino Blue MD    Assisted by:Irene Rey LPN    TIME OUT performed immediately prior to start of procedure:   Noa Bronson MD, have performed the following reviews on Maxwell Hair prior to the start of the procedure:            * Patient was identified by name and date of birth   * Agreement on procedure being performed was verified  * Explained procedure and answered questions  * Procedure site verified and marked as necessary  * Patient was positioned for comfort  * Consent was signed and verified      The risks, benefits and alternatives of ultrasound guided thyroid fine   needle aspiration were discussed with the patient. After all questions were   answered and informed written consent was obtained, patient was prepped  in a supine position. 1% lidocaine was used as local anesthetic. Using sonographic guidance, fine needle aspiration of thyroid  nodule was performed using 25-gauge needles. 5 aspirations were made using 25  G needles  Samples were submitted to cytology. Patient  tolerated procedure well without complications. Advised to keep ice for 30 minutes after going home. After care instructions provided.   Patient was told to expect a call in 2 weeks regarding the results       Pain at the site before procedure 0/10   Pain post procedure  0/10

## 2023-10-09 ENCOUNTER — TELEPHONE (OUTPATIENT)
Age: 63
End: 2023-10-09

## 2023-10-09 NOTE — TELEPHONE ENCOUNTER
Attempted to call. Unsuccessful. Left msg for Nataliia Americavenita to give us a call back at the office. A callback number was left. Per Dr. Julian Steward thyroid cancer seen, but had less cells to go by.  Need to monitor closely and if it increases further repeat biopsy needed\"

## 2023-10-11 NOTE — TELEPHONE ENCOUNTER
Attempted to call. Unsuccessful. Left msg for Richie Hobby to give us a call back at the office. A callback number was left. Letter mailed as phone goes straight to  each time we call.

## 2023-10-11 NOTE — TELEPHONE ENCOUNTER
Attempted to call. Unsuccessful. Left msg for Jovanna Romero to give us a call back at the office. A callback number was left. no

## 2023-10-12 NOTE — TELEPHONE ENCOUNTER
[place]ayed the message to her as she is having trouble receiving the phone calls she is requesting to make biopsy results viewable to her in my chart and to message her if able to or once done and she can log in to check

## 2023-12-01 DIAGNOSIS — E11.65 TYPE 2 DIABETES MELLITUS WITH HYPERGLYCEMIA, WITHOUT LONG-TERM CURRENT USE OF INSULIN (HCC): Primary | ICD-10-CM

## 2023-12-01 DIAGNOSIS — E04.9 NODULAR GOITER: ICD-10-CM

## 2023-12-06 LAB
BUN SERPL-MCNC: 11 MG/DL (ref 8–27)
BUN/CREAT SERPL: 15 (ref 12–28)
CALCIUM SERPL-MCNC: 9.3 MG/DL (ref 8.7–10.3)
CHLORIDE SERPL-SCNC: 104 MMOL/L (ref 96–106)
CO2 SERPL-SCNC: 25 MMOL/L (ref 20–29)
CREAT SERPL-MCNC: 0.75 MG/DL (ref 0.57–1)
EGFRCR SERPLBLD CKD-EPI 2021: 89 ML/MIN/1.73
GLUCOSE SERPL-MCNC: 132 MG/DL (ref 70–99)
HBA1C MFR BLD: 9.2 % (ref 4.8–5.6)
LDLC SERPL DIRECT ASSAY-MCNC: 102 MG/DL (ref 0–99)
POTASSIUM SERPL-SCNC: 4.2 MMOL/L (ref 3.5–5.2)
SODIUM SERPL-SCNC: 143 MMOL/L (ref 134–144)

## 2023-12-08 ENCOUNTER — OFFICE VISIT (OUTPATIENT)
Age: 63
End: 2023-12-08
Payer: COMMERCIAL

## 2023-12-08 VITALS
BODY MASS INDEX: 40.39 KG/M2 | HEIGHT: 65 IN | RESPIRATION RATE: 15 BRPM | HEART RATE: 97 BPM | DIASTOLIC BLOOD PRESSURE: 67 MMHG | TEMPERATURE: 97.4 F | WEIGHT: 242.4 LBS | OXYGEN SATURATION: 98 % | SYSTOLIC BLOOD PRESSURE: 151 MMHG

## 2023-12-08 DIAGNOSIS — E04.9 NODULAR GOITER: ICD-10-CM

## 2023-12-08 DIAGNOSIS — E11.65 TYPE 2 DIABETES MELLITUS WITH HYPERGLYCEMIA, UNSPECIFIED WHETHER LONG TERM INSULIN USE (HCC): Primary | ICD-10-CM

## 2023-12-08 DIAGNOSIS — I10 ESSENTIAL HYPERTENSION: ICD-10-CM

## 2023-12-08 DIAGNOSIS — E78.2 MIXED HYPERLIPIDEMIA: ICD-10-CM

## 2023-12-08 PROCEDURE — 3078F DIAST BP <80 MM HG: CPT | Performed by: INTERNAL MEDICINE

## 2023-12-08 PROCEDURE — 3046F HEMOGLOBIN A1C LEVEL >9.0%: CPT | Performed by: INTERNAL MEDICINE

## 2023-12-08 PROCEDURE — 3077F SYST BP >= 140 MM HG: CPT | Performed by: INTERNAL MEDICINE

## 2023-12-08 PROCEDURE — 99214 OFFICE O/P EST MOD 30 MIN: CPT | Performed by: INTERNAL MEDICINE

## 2023-12-08 NOTE — PATIENT INSTRUCTIONS
Prior Authorizations:  Ozempic approved 08/30/23 -12/31/2099 [facility]:[id number] [facility]      OZEMPIC     Inject 0.25 mg weekly for one month, then increase to 0.5 mg weekly. Some of the side effects are nausea, vomiting and in most cases it will eventually resolve. If there is a family history of medullary thyroid cancer, pancreatic cancer you should not be taking this medication. In some instances sudden shift in the blood glucose can worsen retinopathy since with pre-existing condition. Once you tolerate 0.5 mg weekly we can increase it to 1 mg weekly if desired weight loss or sugar control is not achieved, you need a different prescription for 1 mg weekly dose    Missed dose: Missed dose should be administered as soon as possible within 4 days; resume usual schedule thereafter. If >4 days have elapsed, skip the missed dose and resume administration at the next scheduled weekly dose.

## 2023-12-08 NOTE — PROGRESS NOTES
Devendra Cain MD              Patient Information   Date:3/8/2023   Name : Chapin Castillo 58 y.o.       YOB: 1960           Referred by: Jordi Petersen MD         Chief Complaint   Patient presents with    Diabetes               History of Present Illness: Chapin Castillo is a 58 y.o. female here  for follow-up of  Type 2 Diabetes melitis which was diagnosed in 1998, retinopathy     She is checking blood glucose once daily, no improvement in the A1c  She could not get Ozempic from the pharmacy which was prescribed in August 2023, Ozempic was approved, the fax was sent to the pharmacy.   She was asked to call the pharmacy  Reports palpitations, nervousness, no weight loss  No new medications              Chronic lower extremity edema        Physical Examination:      General: pleasant, no distress, good eye contact    HEENT: no pallor, no periorbital edema, EOMI    Neck: supple,     Cardiovascular: regular,  normal S1 and S2    Respiratory: clear to auscultation bilaterally    Edema since she was 13years old, no etiology        Neurological: alert and oriented    Psychiatric: normal mood and affect    Skin: color, texture, turgor normal.       Diabetic foot exam: October 2018     Left Foot:    Visual Exam: normal     Pulse DP: 2+ (normal)    Filament test: normal sensation     Vibratory sensation: diminished        Right Foot:    Visual Exam: normal     Pulse DP: 2+ (normal)    Filament test: normal sensation     Vibratory sensation: diminished           Data Reviewed:       Lab Results   Component Value Date    GFRAA 99 02/22/2022        Lab Results   Component Value Date    LABA1C 9.2 (H) 12/05/2023    LABA1C 9.1 (H) 08/26/2023    LABA1C 8.6 (H) 03/04/2023         Lab Results   Component Value Date     12/05/2023    K 4.2 12/05/2023     12/05/2023    CO2 25 12/05/2023    BUN 11 12/05/2023    CREATININE 0.75 12/05/2023

## 2023-12-09 LAB
T4 FREE SERPL-MCNC: 1.35 NG/DL (ref 0.82–1.77)
TSH SERPL DL<=0.005 MIU/L-ACNC: 0.91 UIU/ML (ref 0.45–4.5)

## 2024-02-19 ENCOUNTER — PATIENT MESSAGE (OUTPATIENT)
Age: 64
End: 2024-02-19

## 2024-02-19 NOTE — TELEPHONE ENCOUNTER
From: Nalini Desir  To: Dr. Annamarie Marshall  Sent: 2/19/2024 6:29 AM EST  Subject: OZEMPIC    Greetings. I missed a call from you on Friday. I am working with a company called [employer] via my job to obtain Ozempic via mail order.They have sent a request to you for a prescription. See below:    Please fax it to [phone], or call [facility] to connect with their partnered pharmacy, to verbally prescribe the medication request.    Thank you  [facility] Team  [phone] option 1    I am currently out of the Ozempic and only had a partial dose yesterday. I take it on Sundays. Please assist.   Thank you,     Nalini Desir

## 2024-02-26 DIAGNOSIS — E04.9 NODULAR GOITER: ICD-10-CM

## 2024-02-26 DIAGNOSIS — E11.65 TYPE 2 DIABETES MELLITUS WITH HYPERGLYCEMIA, UNSPECIFIED WHETHER LONG TERM INSULIN USE (HCC): ICD-10-CM

## 2024-02-26 DIAGNOSIS — E78.2 MIXED HYPERLIPIDEMIA: ICD-10-CM

## 2024-02-26 DIAGNOSIS — I10 ESSENTIAL HYPERTENSION: ICD-10-CM

## 2024-03-07 LAB
BUN SERPL-MCNC: 12 MG/DL (ref 8–27)
BUN/CREAT SERPL: 18 (ref 12–28)
CALCIUM SERPL-MCNC: 9.3 MG/DL (ref 8.7–10.3)
CHLORIDE SERPL-SCNC: 103 MMOL/L (ref 96–106)
CO2 SERPL-SCNC: 25 MMOL/L (ref 20–29)
CREAT SERPL-MCNC: 0.68 MG/DL (ref 0.57–1)
EGFRCR SERPLBLD CKD-EPI 2021: 98 ML/MIN/1.73
GLUCOSE SERPL-MCNC: 65 MG/DL (ref 70–99)
HBA1C MFR BLD: 8 % (ref 4.8–5.6)
LDLC SERPL DIRECT ASSAY-MCNC: 103 MG/DL (ref 0–99)
POTASSIUM SERPL-SCNC: 4.2 MMOL/L (ref 3.5–5.2)
SODIUM SERPL-SCNC: 142 MMOL/L (ref 134–144)

## 2024-03-08 ENCOUNTER — OFFICE VISIT (OUTPATIENT)
Age: 64
End: 2024-03-08
Payer: COMMERCIAL

## 2024-03-08 VITALS
DIASTOLIC BLOOD PRESSURE: 72 MMHG | OXYGEN SATURATION: 97 % | BODY MASS INDEX: 39.82 KG/M2 | WEIGHT: 239 LBS | SYSTOLIC BLOOD PRESSURE: 142 MMHG | TEMPERATURE: 97.2 F | HEIGHT: 65 IN | HEART RATE: 82 BPM

## 2024-03-08 DIAGNOSIS — E78.2 MIXED HYPERLIPIDEMIA: ICD-10-CM

## 2024-03-08 DIAGNOSIS — I10 ESSENTIAL HYPERTENSION: ICD-10-CM

## 2024-03-08 DIAGNOSIS — E11.65 TYPE 2 DIABETES MELLITUS WITH HYPERGLYCEMIA, WITHOUT LONG-TERM CURRENT USE OF INSULIN (HCC): Primary | ICD-10-CM

## 2024-03-08 PROCEDURE — 3052F HG A1C>EQUAL 8.0%<EQUAL 9.0%: CPT | Performed by: INTERNAL MEDICINE

## 2024-03-08 PROCEDURE — 3077F SYST BP >= 140 MM HG: CPT | Performed by: INTERNAL MEDICINE

## 2024-03-08 PROCEDURE — 3078F DIAST BP <80 MM HG: CPT | Performed by: INTERNAL MEDICINE

## 2024-03-08 PROCEDURE — 99214 OFFICE O/P EST MOD 30 MIN: CPT | Performed by: INTERNAL MEDICINE

## 2024-03-08 RX ORDER — VALSARTAN AND HYDROCHLOROTHIAZIDE 160; 12.5 MG/1; MG/1
1 TABLET, FILM COATED ORAL DAILY
Qty: 30 TABLET | Refills: 4 | Status: SHIPPED | OUTPATIENT
Start: 2024-03-08

## 2024-03-08 RX ORDER — METFORMIN HYDROCHLORIDE 500 MG/1
1000 TABLET, EXTENDED RELEASE ORAL 2 TIMES DAILY WITH MEALS
Qty: 180 TABLET | Refills: 3 | Status: SHIPPED | OUTPATIENT
Start: 2024-03-08

## 2024-03-08 RX ORDER — GLIPIZIDE 10 MG/1
TABLET ORAL
Qty: 60 TABLET | Refills: 3 | Status: SHIPPED | OUTPATIENT
Start: 2024-03-08

## 2024-03-08 NOTE — PROGRESS NOTES
Chief Complaint   Patient presents with    Follow-up     Diabetic type 2 and Pt states glucose levels have really improved        BP (!) 142/72 (Site: Right Upper Arm, Position: Sitting, Cuff Size: Large Adult)   Pulse 82   Temp 97.2 °F (36.2 °C) (Temporal)   Ht 1.651 m (5' 5\")   Wt 108.4 kg (239 lb)   SpO2 97%   BMI 39.77 kg/m²     Pain Scale: 0 - No pain/10  Pain Location:        \"Have you been to the ER, urgent care clinic since your last visit?  Hospitalized since your last visit?\"    NO    “Have you seen or consulted any other health care providers outside of [facility] since your last visit?”    NO    
03[facility]06/2024    [facility]                Assessment/Plan:            1. Type 2 Diabetes Mellitus with  neuropathy,retinopathy     Lab Results   Component Value Date    LABA1C 8.0 (H) 03/06/2024       Expect A1c to improve     Metformin, glipizide 5 mg in the morning, stop 10 mg at night    Ozempic   She had angioedema with Tradjenta, Cannot use Actos due to lower extremity edema     She is a RN by profession,    Eye exam annually       2.  HTN :  Continue      3. Hyperlipidemia :   Declined statin      4.Obesity:Body mass index is 39.61 kg/m².   Discussed about the importance of exercise and carbohydrate portion control.      5.  Sleep apnea: resolved per pt       6.  Nodular goiter: Seen ENT   1.2 cm nodule left 3/19:  Ultrasound thyroid 1.7 cm 2023, FNA solid cystic nodule, limited cellularity, monitor        Chronic edema -since she was 15 years old , no etiology found, information of Dr Montemayor was given for further evaluation of venous insufficiency, could not see him       There are no Patient Instructions on file for this visit.            Thank you for allowing me to participate in the care of this patient.      Annamarie Marshall MD         Patient verbalized understanding       Voice-recognition software was used to generate this report, which may result in some phonetic-based errors in the grammar and contents.  Even though attempts were made to correct all the mistakes,  some may have been missed and remained in the body of the report.

## 2024-05-25 DIAGNOSIS — E78.2 MIXED HYPERLIPIDEMIA: ICD-10-CM

## 2024-05-25 DIAGNOSIS — I10 ESSENTIAL HYPERTENSION: ICD-10-CM

## 2024-05-25 DIAGNOSIS — E11.65 TYPE 2 DIABETES MELLITUS WITH HYPERGLYCEMIA, WITHOUT LONG-TERM CURRENT USE OF INSULIN (HCC): ICD-10-CM

## 2024-05-27 DIAGNOSIS — I10 ESSENTIAL HYPERTENSION: ICD-10-CM

## 2024-05-27 DIAGNOSIS — E78.2 MIXED HYPERLIPIDEMIA: ICD-10-CM

## 2024-05-27 DIAGNOSIS — E11.65 TYPE 2 DIABETES MELLITUS WITH HYPERGLYCEMIA, WITHOUT LONG-TERM CURRENT USE OF INSULIN (HCC): ICD-10-CM

## 2024-05-27 RX ORDER — VALSARTAN AND HYDROCHLOROTHIAZIDE 160; 12.5 MG/1; MG/1
1 TABLET, FILM COATED ORAL DAILY
Qty: 90 TABLET | Refills: 1 | Status: SHIPPED | OUTPATIENT
Start: 2024-05-27

## 2024-05-27 RX ORDER — GLIPIZIDE 10 MG/1
TABLET ORAL
Qty: 180 TABLET | Refills: 1 | Status: SHIPPED | OUTPATIENT
Start: 2024-05-27

## 2024-06-03 DIAGNOSIS — E11.65 TYPE 2 DIABETES MELLITUS WITH HYPERGLYCEMIA, WITHOUT LONG-TERM CURRENT USE OF INSULIN (HCC): ICD-10-CM

## 2024-06-04 DIAGNOSIS — E11.65 TYPE 2 DIABETES MELLITUS WITH HYPERGLYCEMIA, WITHOUT LONG-TERM CURRENT USE OF INSULIN (HCC): ICD-10-CM

## 2024-06-04 RX ORDER — EXENATIDE 250 UG/ML
INJECTION SUBCUTANEOUS
Refills: 0 | OUTPATIENT
Start: 2024-06-04

## 2024-06-06 RX ORDER — SEMAGLUTIDE 0.68 MG/ML
INJECTION, SOLUTION SUBCUTANEOUS
Qty: 9 ML | Refills: 3 | Status: SHIPPED | OUTPATIENT
Start: 2024-06-06

## 2024-06-12 RX ORDER — DULAGLUTIDE 0.75 MG/.5ML
0.75 INJECTION, SOLUTION SUBCUTANEOUS WEEKLY
Qty: 6 ML | Refills: 3 | Status: SHIPPED | OUTPATIENT
Start: 2024-06-12

## 2024-06-19 LAB
ALBUMIN/CREAT UR: 3 MG/G CREAT (ref 0–29)
BUN SERPL-MCNC: 10 MG/DL (ref 8–27)
BUN/CREAT SERPL: 13 (ref 12–28)
CALCIUM SERPL-MCNC: 9.2 MG/DL (ref 8.7–10.3)
CHLORIDE SERPL-SCNC: 103 MMOL/L (ref 96–106)
CO2 SERPL-SCNC: 25 MMOL/L (ref 20–29)
CREAT SERPL-MCNC: 0.76 MG/DL (ref 0.57–1)
CREAT UR-MCNC: 157.4 MG/DL
EGFRCR SERPLBLD CKD-EPI 2021: 88 ML/MIN/1.73
GLUCOSE SERPL-MCNC: 157 MG/DL (ref 70–99)
HBA1C MFR BLD: 7.2 % (ref 4.8–5.6)
LDLC SERPL DIRECT ASSAY-MCNC: 108 MG/DL (ref 0–99)
MICROALBUMIN UR-MCNC: 4.8 UG/ML
POTASSIUM SERPL-SCNC: 4.7 MMOL/L (ref 3.5–5.2)
SODIUM SERPL-SCNC: 140 MMOL/L (ref 134–144)
SPECIMEN STATUS REPORT: NORMAL

## 2024-06-21 ENCOUNTER — OFFICE VISIT (OUTPATIENT)
Age: 64
End: 2024-06-21
Payer: COMMERCIAL

## 2024-06-21 VITALS
SYSTOLIC BLOOD PRESSURE: 152 MMHG | HEART RATE: 96 BPM | BODY MASS INDEX: 39.99 KG/M2 | OXYGEN SATURATION: 99 % | RESPIRATION RATE: 20 BRPM | TEMPERATURE: 97.2 F | DIASTOLIC BLOOD PRESSURE: 74 MMHG | HEIGHT: 65 IN | WEIGHT: 240 LBS

## 2024-06-21 DIAGNOSIS — I10 ESSENTIAL HYPERTENSION: ICD-10-CM

## 2024-06-21 DIAGNOSIS — E78.2 MIXED HYPERLIPIDEMIA: ICD-10-CM

## 2024-06-21 DIAGNOSIS — E11.65 TYPE 2 DIABETES MELLITUS WITH HYPERGLYCEMIA, UNSPECIFIED WHETHER LONG TERM INSULIN USE (HCC): Primary | ICD-10-CM

## 2024-06-21 DIAGNOSIS — E11.65 TYPE 2 DIABETES MELLITUS WITH HYPERGLYCEMIA, WITHOUT LONG-TERM CURRENT USE OF INSULIN (HCC): ICD-10-CM

## 2024-06-21 PROCEDURE — 3077F SYST BP >= 140 MM HG: CPT | Performed by: INTERNAL MEDICINE

## 2024-06-21 PROCEDURE — 99214 OFFICE O/P EST MOD 30 MIN: CPT | Performed by: INTERNAL MEDICINE

## 2024-06-21 PROCEDURE — 3078F DIAST BP <80 MM HG: CPT | Performed by: INTERNAL MEDICINE

## 2024-06-21 PROCEDURE — 3051F HG A1C>EQUAL 7.0%<8.0%: CPT | Performed by: INTERNAL MEDICINE

## 2024-06-21 RX ORDER — DULAGLUTIDE 0.75 MG/.5ML
0.75 INJECTION, SOLUTION SUBCUTANEOUS WEEKLY
Qty: 6 ML | Refills: 3 | Status: SHIPPED | OUTPATIENT
Start: 2024-06-21

## 2024-06-21 ASSESSMENT — PATIENT HEALTH QUESTIONNAIRE - PHQ9
2. FEELING DOWN, DEPRESSED OR HOPELESS: NOT AT ALL
SUM OF ALL RESPONSES TO PHQ QUESTIONS 1-9: 0
SUM OF ALL RESPONSES TO PHQ9 QUESTIONS 1 & 2: 0
1. LITTLE INTEREST OR PLEASURE IN DOING THINGS: NOT AT ALL
SUM OF ALL RESPONSES TO PHQ QUESTIONS 1-9: 0

## 2024-06-21 NOTE — PROGRESS NOTES
Identified pt with two pt identifiers(name and ). Reviewed record in preparation for visit and have obtained necessary documentation. All patient medications has been reviewed.  Chief Complaint   Patient presents with    Type 2 diabetes mellitus with hyperglycemia, without long-t       Vitals:    24 0920   BP: (!) 152/74   Pulse:    Resp:    Temp:    SpO2:                    Coordination of Care Questionnaire:   1) Have you been to an emergency room, urgent care, or hospitalized since your last visit?   No       2. Have seen or consulted any other health care provider since your last visit? No        Patient is accompanied by self I have received verbal consent from Nalini Desir to discuss any/all medical information while they are present in the room.

## 2024-06-21 NOTE — PROGRESS NOTES
[facility] DIABETES AND ENDOCRINOLOGY                 Annamarie Marshall MD              Patient Information     Name : Nalini Desir       YOB: 1960           Referred by: Pelon Boone MD         Chief Complaint   Patient presents with    Type 2 diabetes mellitus with hyperglycemia, without long-t             History of Present Illness: Nalini Desir is  here  for follow-up of  Type 2 Diabetes melitis which was diagnosed in 1998, retinopathy     She is checking blood glucose once daily,   She was doing well with Ozempic, could not get Ozempic from the insurance, Trulicity is on formulary, due to backorder not able to get Trulicity  Now back on glipizide    Did not lose weight with Ozempic, however she was on a smaller dose could not tolerate higher dose  Started Ozempic in the middle of January 2024                  Chronic lower extremity edema        Physical Examination:      General: pleasant, no distress, good eye contact    HEENT: no pallor, no periorbital edema, EOMI    Neck: supple,     Cardiovascular: regular,  normal S1 and S2    Respiratory: clear to auscultation bilaterally    Edema since she was 15 years old, no etiology        Neurological: alert and oriented    Psychiatric: normal mood and affect    Skin: color, texture, turgor normal.       Diabetic foot exam: October 2018     Left Foot:    Visual Exam: normal     Pulse DP: 2+ (normal)    Filament test: normal sensation     Vibratory sensation: diminished        Right Foot:    Visual Exam: normal     Pulse DP: 2+ (normal)    Filament test: normal sensation     Vibratory sensation: diminished           Data Reviewed:       Lab Results   Component Value Date    GFRAA 99 02/22/2022        Lab Results   Component Value Date    LABA1C 7.2 (H) 06/18/2024    LABA1C 8.0 (H) 03/06/2024    LABA1C 9.2 (H) 12/05/2023         Lab Results   Component Value Date     06/18/2024    K 4.7 06/18/2024     06/18/2024    CO2 25

## 2024-09-05 DIAGNOSIS — I10 ESSENTIAL HYPERTENSION: ICD-10-CM

## 2024-09-05 DIAGNOSIS — E11.65 TYPE 2 DIABETES MELLITUS WITH HYPERGLYCEMIA, WITHOUT LONG-TERM CURRENT USE OF INSULIN (HCC): ICD-10-CM

## 2024-09-05 DIAGNOSIS — E78.2 MIXED HYPERLIPIDEMIA: ICD-10-CM

## 2024-09-05 RX ORDER — METFORMIN HCL 500 MG
1000 TABLET, EXTENDED RELEASE 24 HR ORAL 2 TIMES DAILY WITH MEALS
Qty: 360 TABLET | Refills: 3 | Status: SHIPPED | OUTPATIENT
Start: 2024-09-05

## 2024-10-28 ENCOUNTER — OFFICE VISIT (OUTPATIENT)
Age: 64
End: 2024-10-28

## 2024-10-28 VITALS
HEIGHT: 65 IN | OXYGEN SATURATION: 98 % | DIASTOLIC BLOOD PRESSURE: 76 MMHG | SYSTOLIC BLOOD PRESSURE: 152 MMHG | BODY MASS INDEX: 40.04 KG/M2 | HEART RATE: 98 BPM | TEMPERATURE: 97.4 F | WEIGHT: 240.3 LBS

## 2024-10-28 DIAGNOSIS — E11.65 TYPE 2 DIABETES MELLITUS WITH HYPERGLYCEMIA, WITHOUT LONG-TERM CURRENT USE OF INSULIN (HCC): Primary | ICD-10-CM

## 2024-10-28 DIAGNOSIS — E04.2 MULTINODULAR GOITER: ICD-10-CM

## 2024-10-28 DIAGNOSIS — I10 ESSENTIAL HYPERTENSION: ICD-10-CM

## 2024-10-28 DIAGNOSIS — E78.2 MIXED HYPERLIPIDEMIA: ICD-10-CM

## 2024-10-28 LAB — HBA1C MFR BLD: 7.1 %

## 2024-10-28 PROCEDURE — 99214 OFFICE O/P EST MOD 30 MIN: CPT | Performed by: INTERNAL MEDICINE

## 2024-10-28 PROCEDURE — 3078F DIAST BP <80 MM HG: CPT | Performed by: INTERNAL MEDICINE

## 2024-10-28 PROCEDURE — 3077F SYST BP >= 140 MM HG: CPT | Performed by: INTERNAL MEDICINE

## 2024-10-28 PROCEDURE — 3051F HG A1C>EQUAL 7.0%<8.0%: CPT | Performed by: INTERNAL MEDICINE

## 2024-10-28 PROCEDURE — 83036 HEMOGLOBIN GLYCOSYLATED A1C: CPT | Performed by: INTERNAL MEDICINE

## 2024-10-28 NOTE — PROGRESS NOTES
[facility] DIABETES AND ENDOCRINOLOGY                 Annamarie Marshall MD              Patient Information     Name : Nalini Desir       YOB: 1960           Referred by: Pelon Boone MD         Chief Complaint   Patient presents with    Diabetes    Hypertension             History of Present Illness: Nalini Desir is  here  for follow-up of  Type 2 Diabetes melitis which was diagnosed in 1998, retinopathy     She is checking blood glucose once daily,   She was doing well with Ozempic, could not get Ozempic from the insurance, Trulicity is on formulary, due to backorder not able to get Trulicity  Now back on glipizide    Did not lose weight with Ozempic, however she was on a smaller dose could not tolerate higher dose  Started Ozempic in the middle of January 2024      History of Present Illness     Weight stable despite no appetite suppression from Trulicity. Adjusts glipizide dosage based on blood sugar: full tablet if around 90, half if lower. Tolerates Trulicity 0.75 mg well, unlike Ozempic, which caused severe side effects. Checks blood sugar primarily in the morning, occasionally in the evening if morning reading is low.    Experiences neck discomfort when bending forward, leading to choking, mitigated by sitting upright while eating. Occasional acid reflux.    Intermittent numbness in feet, especially left, over the past two weeks. Also experienced numbness in arm and leg, different from feet sensation. No facial numbness, weakness, slurred speech, or deviation. Suspects torn rotator cuffs but no stroke. History of sciatica. Stopped baby aspirin due to gastritis diagnosed during colonoscopy. No current neurologist. Hand numbness occurs when holding cell phone in bed.              Chronic lower extremity edema        Physical Examination:      General: pleasant, no distress, good eye contact    HEENT: no pallor, no periorbital edema, EOMI    Neck: supple,     Cardiovascular:

## 2024-10-28 NOTE — PROGRESS NOTES
Nalini Desir is a 64 y.o. female here for   Chief Complaint   Patient presents with    Diabetes    Hypertension       1. Have you been to the ER, urgent care clinic since your last visit?  Hospitalized since your last visit? - no    2. Have you seen or consulted any other health care providers outside of the [facility] since your last visit?  Include any pap smears or colon screening.- no

## 2024-12-03 DIAGNOSIS — E78.2 MIXED HYPERLIPIDEMIA: ICD-10-CM

## 2024-12-03 DIAGNOSIS — I10 ESSENTIAL HYPERTENSION: ICD-10-CM

## 2024-12-03 DIAGNOSIS — E11.65 TYPE 2 DIABETES MELLITUS WITH HYPERGLYCEMIA, WITHOUT LONG-TERM CURRENT USE OF INSULIN (HCC): ICD-10-CM

## 2024-12-03 RX ORDER — GLIPIZIDE 10 MG/1
TABLET ORAL
Qty: 180 TABLET | Refills: 3 | Status: SHIPPED | OUTPATIENT
Start: 2024-12-03

## 2024-12-12 DIAGNOSIS — E11.65 TYPE 2 DIABETES MELLITUS WITH HYPERGLYCEMIA, WITHOUT LONG-TERM CURRENT USE OF INSULIN (HCC): ICD-10-CM

## 2024-12-12 DIAGNOSIS — I10 ESSENTIAL HYPERTENSION: ICD-10-CM

## 2024-12-12 DIAGNOSIS — E78.2 MIXED HYPERLIPIDEMIA: ICD-10-CM

## 2024-12-13 RX ORDER — VALSARTAN AND HYDROCHLOROTHIAZIDE 160; 12.5 MG/1; MG/1
1 TABLET, FILM COATED ORAL DAILY
Qty: 90 TABLET | Refills: 3 | Status: SHIPPED | OUTPATIENT
Start: 2024-12-13

## 2025-02-17 DIAGNOSIS — I10 ESSENTIAL HYPERTENSION: ICD-10-CM

## 2025-02-17 DIAGNOSIS — E11.65 TYPE 2 DIABETES MELLITUS WITH HYPERGLYCEMIA, WITHOUT LONG-TERM CURRENT USE OF INSULIN (HCC): ICD-10-CM

## 2025-02-17 DIAGNOSIS — E78.2 MIXED HYPERLIPIDEMIA: ICD-10-CM

## 2025-04-30 SDOH — HEALTH STABILITY: PHYSICAL HEALTH: ON AVERAGE, HOW MANY DAYS PER WEEK DO YOU ENGAGE IN MODERATE TO STRENUOUS EXERCISE (LIKE A BRISK WALK)?: 1 DAY

## 2025-04-30 SDOH — HEALTH STABILITY: PHYSICAL HEALTH: ON AVERAGE, HOW MANY MINUTES DO YOU ENGAGE IN EXERCISE AT THIS LEVEL?: 10 MIN

## 2025-05-01 ENCOUNTER — OFFICE VISIT (OUTPATIENT)
Facility: CLINIC | Age: 65
End: 2025-05-01

## 2025-05-01 VITALS
HEIGHT: 65 IN | TEMPERATURE: 97.2 F | BODY MASS INDEX: 38.49 KG/M2 | SYSTOLIC BLOOD PRESSURE: 138 MMHG | DIASTOLIC BLOOD PRESSURE: 72 MMHG | HEART RATE: 84 BPM | OXYGEN SATURATION: 97 % | RESPIRATION RATE: 16 BRPM | WEIGHT: 231 LBS

## 2025-05-01 DIAGNOSIS — Z76.89 ENCOUNTER TO ESTABLISH CARE: Primary | ICD-10-CM

## 2025-05-01 DIAGNOSIS — M25.562 CHRONIC PAIN OF LEFT KNEE: ICD-10-CM

## 2025-05-01 DIAGNOSIS — Z12.31 ENCOUNTER FOR SCREENING MAMMOGRAM FOR BREAST CANCER: ICD-10-CM

## 2025-05-01 DIAGNOSIS — Z11.59 NEED FOR HEPATITIS C SCREENING TEST: ICD-10-CM

## 2025-05-01 DIAGNOSIS — E04.9 NODULAR GOITER: ICD-10-CM

## 2025-05-01 DIAGNOSIS — I10 PRIMARY HYPERTENSION: ICD-10-CM

## 2025-05-01 DIAGNOSIS — G89.29 CHRONIC PAIN OF LEFT KNEE: ICD-10-CM

## 2025-05-01 DIAGNOSIS — I89.0 LYMPHEDEMA: ICD-10-CM

## 2025-05-01 DIAGNOSIS — E11.65 TYPE 2 DIABETES MELLITUS WITH HYPERGLYCEMIA, WITHOUT LONG-TERM CURRENT USE OF INSULIN (HCC): ICD-10-CM

## 2025-05-01 DIAGNOSIS — Z12.4 ENCOUNTER FOR PAPANICOLAOU SMEAR OF CERVIX: ICD-10-CM

## 2025-05-01 DIAGNOSIS — Z11.4 SCREENING FOR HIV WITHOUT PRESENCE OF RISK FACTORS: ICD-10-CM

## 2025-05-01 DIAGNOSIS — I49.9 CARDIAC ARRHYTHMIA, UNSPECIFIED CARDIAC ARRHYTHMIA TYPE: ICD-10-CM

## 2025-05-01 LAB — GLUCOSE, POC: 90 MG/DL

## 2025-05-01 RX ORDER — PHENOL 1.4 %
1 AEROSOL, SPRAY (ML) MUCOUS MEMBRANE DAILY
COMMUNITY

## 2025-05-01 SDOH — ECONOMIC STABILITY: FOOD INSECURITY: WITHIN THE PAST 12 MONTHS, YOU WORRIED THAT YOUR FOOD WOULD RUN OUT BEFORE YOU GOT MONEY TO BUY MORE.: NEVER TRUE

## 2025-05-01 SDOH — ECONOMIC STABILITY: FOOD INSECURITY: WITHIN THE PAST 12 MONTHS, THE FOOD YOU BOUGHT JUST DIDN'T LAST AND YOU DIDN'T HAVE MONEY TO GET MORE.: NEVER TRUE

## 2025-05-01 ASSESSMENT — PATIENT HEALTH QUESTIONNAIRE - PHQ9
SUM OF ALL RESPONSES TO PHQ QUESTIONS 1-9: 0
2. FEELING DOWN, DEPRESSED OR HOPELESS: NOT AT ALL
1. LITTLE INTEREST OR PLEASURE IN DOING THINGS: NOT AT ALL

## 2025-05-01 ASSESSMENT — ENCOUNTER SYMPTOMS
CHEST TIGHTNESS: 1
NAUSEA: 0
VOMITING: 0
COUGH: 0
WHEEZING: 0
BACK PAIN: 0
ABDOMINAL PAIN: 0
SHORTNESS OF BREATH: 0
CONSTIPATION: 0
DIARRHEA: 0

## 2025-05-01 NOTE — PROGRESS NOTES
Nalini Desir  64 y.o. female  1960  [address]  [facility] [address]  [id number]     [place] [facility]: Progress Note       Encounter Date: 5/1/2025    Patient presents with the following chief complaint(s)    Chief Complaint   Patient presents with    New Patient        History provided by patient    Assessment and Plan:   1. Encounter to establish care  2. Type 2 diabetes mellitus with hyperglycemia, without long-term current use of insulin (HCC)  Comments:  Referral placed to endocrinology glucose WNL today  Orders:  -     AMB POC GLUCOSE BLOOD, BY GLUCOSE MONITORING DEVICE  -     [facility] - Annamarie Marshall MD, Endocrinology, [place]  3. Primary hypertension  Comments:  Stable today.  Lab orders pending by endocrinology  4. Lymphedema  Comments:  Referral placed for lymphedema clinic.  Orders:  -     [facility]  5. Nodular goiter  Comments:  Referral placed to endocrinology.  Order placed for ultrasound of thyroid  Orders:  -     US THYROID; Future  6. Cardiac arrhythmia, unspecified cardiac arrhythmia type  Comments:  On physical exam.  Referral placed to cardiology for further evaluation.  Orders:  -     [facility] - Malu Simmons MD, Cardiology, [place]  7. Chronic pain of left knee  Comments:  Referral placed to orthopedics for further evaluation.  Orders:  -     [facility]- Candido Hargrove MD Orthopedic Surgery (knee)[facility]  8. Encounter for screening mammogram for breast cancer  Comments:  Placed today.  Orders:  -     [facility] DAMARIS DIGITAL SCREEN BILATERAL [[id number]]; Future  9. Screening for HIV without presence of risk factors  -     HIV 1/2 Ag/Ab, 4TH Generation,W Rflx Confirm; Future  10. Need for hepatitis C screening test  -     Hepatitis C Antibody; Future  11. Encounter for Papanicolaou smear of cervix  -     [facility] - Carmen Finch MD, Ob-Gyn, [place]         Return in about 6 months (around

## 2025-05-01 NOTE — PROGRESS NOTES
Have you been to the ER, urgent care clinic since your last visit?  Hospitalized since your last visit?   YES - When: approximately 3/7/25 ago.  Where and Why: Care now for flu.    Have you seen or consulted any other health care providers outside our system since your last visit?   NO    Have you had a mammogram?   NO    Date of last Mammogram: 11/20/2021      “Have you had a pap smear?   NO    Date of last Cervical Cancer screen (HPV or PAP): 2/14/2015       Have you had a colorectal cancer screening such as a colonoscopy/FIT/Cologuard?    NO    No colonoscopy on file  No cologuard on file  No FIT/FOBT on file   No flexible sigmoidoscopy on file     “Have you had a diabetic eye exam?”    NO     Date of last diabetic eye exam: 3/21/2019     Chief Complaint   Patient presents with    New Patient     /72 (BP Site: Left Upper Arm, Patient Position: Sitting, BP Cuff Size: Medium Adult)   Pulse 84   Temp 97.2 °F (36.2 °C) (Skin)   Resp 16   Ht 1.651 m (5' 5\")   Wt 104.8 kg (231 lb)   LMP  (LMP Unknown)   SpO2 97%   BMI 38.44 kg/m²

## 2025-05-13 ENCOUNTER — OFFICE VISIT (OUTPATIENT)
Age: 65
End: 2025-05-13
Payer: COMMERCIAL

## 2025-05-13 VITALS
OXYGEN SATURATION: 97 % | HEIGHT: 65 IN | SYSTOLIC BLOOD PRESSURE: 134 MMHG | DIASTOLIC BLOOD PRESSURE: 75 MMHG | HEART RATE: 75 BPM | BODY MASS INDEX: 38.49 KG/M2 | WEIGHT: 231 LBS

## 2025-05-13 DIAGNOSIS — M17.12: ICD-10-CM

## 2025-05-13 DIAGNOSIS — M25.562 CHRONIC PAIN OF LEFT KNEE: ICD-10-CM

## 2025-05-13 DIAGNOSIS — M21.162 GENU VARUM, ACQUIRED, LEFT: ICD-10-CM

## 2025-05-13 DIAGNOSIS — M17.11 PRIMARY OSTEOARTHRITIS OF RIGHT KNEE: ICD-10-CM

## 2025-05-13 DIAGNOSIS — M17.12 PRIMARY OSTEOARTHRITIS OF LEFT KNEE: Primary | ICD-10-CM

## 2025-05-13 DIAGNOSIS — M21.161 GENU VARUM, ACQUIRED, RIGHT: ICD-10-CM

## 2025-05-13 DIAGNOSIS — G89.29 CHRONIC PAIN OF LEFT KNEE: ICD-10-CM

## 2025-05-13 PROCEDURE — 20610 DRAIN/INJ JOINT/BURSA W/O US: CPT | Performed by: ORTHOPAEDIC SURGERY

## 2025-05-13 PROCEDURE — 99204 OFFICE O/P NEW MOD 45 MIN: CPT | Performed by: ORTHOPAEDIC SURGERY

## 2025-05-13 PROCEDURE — 3075F SYST BP GE 130 - 139MM HG: CPT | Performed by: ORTHOPAEDIC SURGERY

## 2025-05-13 PROCEDURE — 3078F DIAST BP <80 MM HG: CPT | Performed by: ORTHOPAEDIC SURGERY

## 2025-05-13 RX ORDER — TRIAMCINOLONE ACETONIDE 40 MG/ML
40 INJECTION, SUSPENSION INTRA-ARTICULAR; INTRAMUSCULAR ONCE
Status: COMPLETED | OUTPATIENT
Start: 2025-05-13 | End: 2025-05-13

## 2025-05-13 RX ORDER — DICLOFENAC SODIUM 75 MG/1
75 TABLET, DELAYED RELEASE ORAL DAILY
Qty: 30 TABLET | Refills: 3 | Status: SHIPPED | OUTPATIENT
Start: 2025-05-13

## 2025-05-13 RX ADMIN — TRIAMCINOLONE ACETONIDE 40 MG: 40 INJECTION, SUSPENSION INTRA-ARTICULAR; INTRAMUSCULAR at 15:55

## 2025-05-13 ASSESSMENT — PATIENT HEALTH QUESTIONNAIRE - PHQ9
SUM OF ALL RESPONSES TO PHQ QUESTIONS 1-9: 0
1. LITTLE INTEREST OR PLEASURE IN DOING THINGS: NOT AT ALL
SUM OF ALL RESPONSES TO PHQ QUESTIONS 1-9: 0
SUM OF ALL RESPONSES TO PHQ QUESTIONS 1-9: 0
2. FEELING DOWN, DEPRESSED OR HOPELESS: NOT AT ALL
SUM OF ALL RESPONSES TO PHQ QUESTIONS 1-9: 0

## 2025-05-13 NOTE — PROGRESS NOTES
Identified pt with two pt identifiers (name and ). Reviewed chart in preparation for visit and have obtained necessary documentation.     Nalini Desir is a 64 y.o. female New Patient (Np Left knee pain- having some pain and swelling in her knee as shes walking around she feels her knee give out on her it has been going on since February and has been getting worse over the last few weeks  )  .     Vitals:    25 1502 25 1503   BP: (!) 148/87 134/75   BP Site: Right Upper Arm Right Upper Arm   Patient Position: Sitting Sitting   BP Cuff Size: Large Adult Large Adult   Pulse: 93 75   SpO2: 96% 97%   Weight: 104.8 kg (231 lb)    Height: 1.651 m (5' 5\")            1. Have you been to the ER, urgent care clinic since your last visit?  Hospitalized since your last visit?  no    2. Have you seen or consulted any other health care providers outside of the [facility] since your last visit?  Include any pap smears or colon screening.  no

## 2025-05-13 NOTE — PROGRESS NOTES
Subjective:      Patient ID: Nalini Desir is a 64 y.o.  female.    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  She has had some mild intermittent achiness in her left knee ongoing for the past 10 years or more but nothing major.  She was told by an orthopedic surgeon in the past that she had significant arthritic changes in her right knee but this is asymptomatic.  Patient denies any injury to either knee.  She has never had any injections or treatment for her knees.  Her primary care provider placed her on Aleve twice a day which helped some.    Social History     Occupational History    Not on file   Tobacco Use    Smoking status: Never    Smokeless tobacco: Never   Vaping Use    Vaping status: Never Used   Substance and Sexual Activity    Alcohol use: No     Alcohol/week: 0.0 standard drinks of alcohol    Drug use: No    Sexual activity: Not Currently        Past Medical History:   Diagnosis Date    Apnea      Asthma      Goiter      Hyperlipidemia      Hypertension      Menopause      Type 2 diabetes mellitus (HCC)           Past Surgical History         Past Surgical History:   Procedure Laterality Date    BREAST BIOPSY Right 2017     benign     SECTION   86,87,89     X3    CHOLECYSTECTOMY        lap    HERNIA REPAIR       RYANN STEREO BREAST BX W LOC DEVICE 1ST LESION RIGHT Right 2017     RYANN STEROTACTIC LOC BREAST BIOPSY RIGHT 2017 [facility] RAD MAMMO

## 2025-05-16 ENCOUNTER — HOSPITAL ENCOUNTER (OUTPATIENT)
Facility: HOSPITAL | Age: 65
Discharge: HOME | End: 2025-05-19
Payer: COMMERCIAL

## 2025-05-16 DIAGNOSIS — Z12.31 ENCOUNTER FOR SCREENING MAMMOGRAM FOR BREAST CANCER: ICD-10-CM

## 2025-05-16 PROCEDURE — 77063 BREAST TOMOSYNTHESIS BI: CPT

## 2025-05-19 DIAGNOSIS — E11.65 TYPE 2 DIABETES MELLITUS WITH HYPERGLYCEMIA, WITHOUT LONG-TERM CURRENT USE OF INSULIN (HCC): ICD-10-CM

## 2025-05-20 RX ORDER — DULAGLUTIDE 0.75 MG/.5ML
0.75 INJECTION, SOLUTION SUBCUTANEOUS WEEKLY
Qty: 6 ML | Refills: 0 | Status: ACTIVE | OUTPATIENT
Start: 2025-05-20

## 2025-05-29 ENCOUNTER — OFFICE VISIT (OUTPATIENT)
Age: 65
End: 2025-05-29
Payer: COMMERCIAL

## 2025-05-29 VITALS
WEIGHT: 235.8 LBS | BODY MASS INDEX: 39.24 KG/M2 | DIASTOLIC BLOOD PRESSURE: 70 MMHG | SYSTOLIC BLOOD PRESSURE: 155 MMHG | HEART RATE: 91 BPM

## 2025-05-29 DIAGNOSIS — Z01.419 WELL WOMAN EXAM WITH ROUTINE GYNECOLOGICAL EXAM: Primary | ICD-10-CM

## 2025-05-29 DIAGNOSIS — A60.04 HERPES SIMPLEX VULVOVAGINITIS: ICD-10-CM

## 2025-05-29 DIAGNOSIS — Z12.4 SCREENING FOR MALIGNANT NEOPLASM OF CERVIX: ICD-10-CM

## 2025-05-29 PROCEDURE — 3078F DIAST BP <80 MM HG: CPT | Performed by: NURSE PRACTITIONER

## 2025-05-29 PROCEDURE — 3077F SYST BP >= 140 MM HG: CPT | Performed by: NURSE PRACTITIONER

## 2025-05-29 PROCEDURE — 99396 PREV VISIT EST AGE 40-64: CPT | Performed by: NURSE PRACTITIONER

## 2025-05-29 RX ORDER — VALACYCLOVIR HYDROCHLORIDE 500 MG/1
500 TABLET, FILM COATED ORAL DAILY
Qty: 30 TABLET | Refills: 11 | Status: SHIPPED | OUTPATIENT
Start: 2025-05-29

## 2025-05-29 NOTE — PROGRESS NOTES
Nalini Desir is a ,  64 y.o. female   No LMP recorded (lmp unknown). Patient is postmenopausal. Pt reports hx of genital herpes had a recent outbreak and requesting treatment.     She presents for her annual checkup.     She is having no problems.    Menstrual status:  Postmenopausal    Contraception:    The current method of family planning is abstinence/ postmenopausal    Hormonal status:  She reports no perimenstrual type symptoms.   She is not having vasomotor symptoms.  The patient is not using any ERT.    Sexual history:    She  reports that she is not currently sexually active and has had partner(s) who are male.    Medical conditions:    Since her last annual GYN exam about  9 years  ago, she has not the following changes in her health history: none.     Surgical history confirmed with patient.  has a past surgical history that includes RYANN STEREO BREAST BX W LOC DEVICE 1ST LESION RIGHT (Right, 2017); Breast biopsy (Right, 2017);  section (86,87,89); Cholecystectomy (); hernia repair (); and Tubal ligation (1989).    Pap and Mammogram History:    Her most recent Pap smear was normal, obtained 9 year(s) ago.    The patient had a recent mammogram 25 which was negative for malignancy.    Narrative & Impression  STUDY: Bilateral digital screening mammogram with 3-D tomosynthesis     INDICATION: Screening.     COMPARISON: 2021 and 2020.     BREAST COMPOSITION: There are scattered areas of fibroglandular density.     FINDINGS: Bilateral digital screening mammography was performed and is  interpreted in conjunction with a computer assisted detection (CAD) system.  Additionally, tomosynthesis of both breasts in the CC and MLO projections was  performed. No significant change in small nodular densities bilaterally. No  suspicious masses or calcifications are identified.  There has been no  significant change.      IMPRESSION:  BI-RADS 2: Benign. No mammographic

## 2025-05-31 ENCOUNTER — HOSPITAL ENCOUNTER (OUTPATIENT)
Facility: HOSPITAL | Age: 65
End: 2025-05-31
Payer: COMMERCIAL

## 2025-05-31 DIAGNOSIS — E04.9 NODULAR GOITER: ICD-10-CM

## 2025-05-31 PROCEDURE — 76536 US EXAM OF HEAD AND NECK: CPT

## 2025-06-01 LAB
., LABCORP: NORMAL
C TRACH RRNA CVX QL NAA+PROBE: NEGATIVE
CYTOLOGIST CVX/VAG CYTO: NORMAL
CYTOLOGY CVX/VAG DOC CYTO: NORMAL
CYTOLOGY CVX/VAG DOC THIN PREP: NORMAL
DX ICD CODE: NORMAL
N GONORRHOEA RRNA CVX QL NAA+PROBE: NEGATIVE
OTHER STN SPEC: NORMAL
SERVICE CMNT-IMP: NORMAL
STAT OF ADQ CVX/VAG CYTO-IMP: NORMAL
T VAGINALIS RRNA SPEC QL NAA+PROBE: NEGATIVE

## 2025-06-02 ENCOUNTER — RESULTS FOLLOW-UP (OUTPATIENT)
Age: 65
End: 2025-06-02

## 2025-06-24 ENCOUNTER — RESULTS FOLLOW-UP (OUTPATIENT)
Facility: CLINIC | Age: 65
End: 2025-06-24

## 2025-06-24 LAB
ALBUMIN/CREAT UR: 4 MG/G CREAT (ref 0–29)
BUN SERPL-MCNC: 13 MG/DL (ref 8–27)
BUN/CREAT SERPL: 19 (ref 12–28)
CALCIUM SERPL-MCNC: 8.7 MG/DL (ref 8.7–10.3)
CHLORIDE SERPL-SCNC: 107 MMOL/L (ref 96–106)
CHOLEST SERPL-MCNC: 179 MG/DL (ref 100–199)
CO2 SERPL-SCNC: 23 MMOL/L (ref 20–29)
CREAT SERPL-MCNC: 0.67 MG/DL (ref 0.57–1)
CREAT UR-MCNC: 153.2 MG/DL
EGFRCR SERPLBLD CKD-EPI 2021: 98 ML/MIN/1.73
GLUCOSE SERPL-MCNC: 93 MG/DL (ref 70–99)
HBA1C MFR BLD: 7 % (ref 4.8–5.6)
HCV IGG SERPL QL IA: NON REACTIVE
HDLC SERPL-MCNC: 55 MG/DL
HIV 1+2 AB+HIV1 P24 AG SERPL QL IA: NON REACTIVE
IMP & REVIEW OF LAB RESULTS: NORMAL
LDLC SERPL CALC-MCNC: 112 MG/DL (ref 0–99)
Lab: NORMAL
MICROALBUMIN UR-MCNC: 6.3 UG/ML
POTASSIUM SERPL-SCNC: 4.1 MMOL/L (ref 3.5–5.2)
SODIUM SERPL-SCNC: 144 MMOL/L (ref 134–144)
TRIGL SERPL-MCNC: 62 MG/DL (ref 0–149)
VLDLC SERPL CALC-MCNC: 12 MG/DL (ref 5–40)

## 2025-08-06 ENCOUNTER — OFFICE VISIT (OUTPATIENT)
Age: 65
End: 2025-08-06
Payer: COMMERCIAL

## 2025-08-06 VITALS
TEMPERATURE: 98.4 F | HEART RATE: 66 BPM | HEIGHT: 65 IN | OXYGEN SATURATION: 98 % | WEIGHT: 241.5 LBS | BODY MASS INDEX: 40.24 KG/M2 | DIASTOLIC BLOOD PRESSURE: 66 MMHG | SYSTOLIC BLOOD PRESSURE: 133 MMHG

## 2025-08-06 DIAGNOSIS — I10 PRIMARY HYPERTENSION: ICD-10-CM

## 2025-08-06 DIAGNOSIS — E11.65 TYPE 2 DIABETES MELLITUS WITH HYPERGLYCEMIA, WITHOUT LONG-TERM CURRENT USE OF INSULIN (HCC): ICD-10-CM

## 2025-08-06 DIAGNOSIS — E04.9 NODULAR GOITER: ICD-10-CM

## 2025-08-06 DIAGNOSIS — I10 ESSENTIAL HYPERTENSION: ICD-10-CM

## 2025-08-06 DIAGNOSIS — E78.2 MIXED HYPERLIPIDEMIA: ICD-10-CM

## 2025-08-06 DIAGNOSIS — E11.65 TYPE 2 DIABETES MELLITUS WITH HYPERGLYCEMIA, WITHOUT LONG-TERM CURRENT USE OF INSULIN (HCC): Primary | ICD-10-CM

## 2025-08-06 PROCEDURE — 3051F HG A1C>EQUAL 7.0%<8.0%: CPT | Performed by: INTERNAL MEDICINE

## 2025-08-06 PROCEDURE — 99214 OFFICE O/P EST MOD 30 MIN: CPT | Performed by: INTERNAL MEDICINE

## 2025-08-06 PROCEDURE — 3075F SYST BP GE 130 - 139MM HG: CPT | Performed by: INTERNAL MEDICINE

## 2025-08-06 PROCEDURE — 3078F DIAST BP <80 MM HG: CPT | Performed by: INTERNAL MEDICINE

## 2025-08-06 RX ORDER — METFORMIN HYDROCHLORIDE 500 MG/1
1000 TABLET, EXTENDED RELEASE ORAL 2 TIMES DAILY WITH MEALS
Qty: 360 TABLET | Refills: 3 | Status: SHIPPED | OUTPATIENT
Start: 2025-08-06